# Patient Record
Sex: FEMALE | Race: WHITE | NOT HISPANIC OR LATINO | Employment: FULL TIME | URBAN - METROPOLITAN AREA
[De-identification: names, ages, dates, MRNs, and addresses within clinical notes are randomized per-mention and may not be internally consistent; named-entity substitution may affect disease eponyms.]

---

## 2013-09-05 LAB
EXTERNAL HIV SCREEN: NORMAL
HCV AB SER-ACNC: NEGATIVE

## 2017-03-16 ENCOUNTER — GENERIC CONVERSION - ENCOUNTER (OUTPATIENT)
Dept: OTHER | Facility: OTHER | Age: 37
End: 2017-03-16

## 2017-03-16 ENCOUNTER — ALLSCRIPTS OFFICE VISIT (OUTPATIENT)
Dept: OTHER | Facility: OTHER | Age: 37
End: 2017-03-16

## 2017-03-16 DIAGNOSIS — E11.9 TYPE 2 DIABETES MELLITUS WITHOUT COMPLICATIONS (HCC): ICD-10-CM

## 2017-03-16 DIAGNOSIS — R40.0 SOMNOLENCE: ICD-10-CM

## 2017-03-16 LAB
EST. AVERAGE GLUCOSE BLD GHB EST-MCNC: 143 MG/DL
HBA1C MFR BLD HPLC: 6.6 %

## 2017-03-21 ENCOUNTER — GENERIC CONVERSION - ENCOUNTER (OUTPATIENT)
Dept: OTHER | Facility: OTHER | Age: 37
End: 2017-03-21

## 2017-05-03 ENCOUNTER — ALLSCRIPTS OFFICE VISIT (OUTPATIENT)
Dept: OTHER | Facility: OTHER | Age: 37
End: 2017-05-03

## 2017-07-30 ENCOUNTER — ANESTHESIA EVENT (OUTPATIENT)
Dept: PERIOP | Facility: HOSPITAL | Age: 37
End: 2017-07-30
Payer: COMMERCIAL

## 2017-07-30 RX ORDER — SODIUM CHLORIDE 9 MG/ML
125 INJECTION, SOLUTION INTRAVENOUS CONTINUOUS
Status: CANCELLED | OUTPATIENT
Start: 2017-08-03

## 2017-07-31 ENCOUNTER — APPOINTMENT (OUTPATIENT)
Dept: PREADMISSION TESTING | Facility: HOSPITAL | Age: 37
End: 2017-07-31
Payer: COMMERCIAL

## 2017-07-31 ENCOUNTER — TRANSCRIBE ORDERS (OUTPATIENT)
Dept: ADMINISTRATIVE | Facility: HOSPITAL | Age: 37
End: 2017-07-31

## 2017-07-31 ENCOUNTER — APPOINTMENT (OUTPATIENT)
Dept: LAB | Facility: HOSPITAL | Age: 37
End: 2017-07-31
Attending: OBSTETRICS & GYNECOLOGY
Payer: COMMERCIAL

## 2017-07-31 VITALS
HEART RATE: 76 BPM | WEIGHT: 209 LBS | HEIGHT: 68 IN | TEMPERATURE: 98.4 F | SYSTOLIC BLOOD PRESSURE: 110 MMHG | DIASTOLIC BLOOD PRESSURE: 70 MMHG | BODY MASS INDEX: 31.67 KG/M2 | RESPIRATION RATE: 18 BRPM

## 2017-07-31 DIAGNOSIS — Z30.2 STERILIZATION: Primary | ICD-10-CM

## 2017-07-31 DIAGNOSIS — Z30.2 STERILIZATION: ICD-10-CM

## 2017-07-31 RX ORDER — VALACYCLOVIR HYDROCHLORIDE 500 MG/1
500 TABLET, FILM COATED ORAL DAILY PRN
COMMUNITY

## 2017-07-31 RX ORDER — ASCORBIC ACID 500 MG
1000 TABLET ORAL DAILY
COMMUNITY

## 2017-07-31 RX ORDER — CITALOPRAM 40 MG/1
40 TABLET ORAL EVERY EVENING
COMMUNITY

## 2017-07-31 RX ORDER — MAG HYDROX/ALUMINUM HYD/SIMETH 400-400-40
1 SUSPENSION, ORAL (FINAL DOSE FORM) ORAL DAILY
COMMUNITY

## 2017-07-31 RX ORDER — AMLODIPINE BESYLATE 10 MG/1
10 TABLET ORAL EVERY EVENING
COMMUNITY
End: 2020-04-14 | Stop reason: SDUPTHER

## 2017-07-31 RX ORDER — CETIRIZINE HYDROCHLORIDE 10 MG/1
10 TABLET ORAL DAILY
COMMUNITY

## 2017-08-03 ENCOUNTER — ANESTHESIA (OUTPATIENT)
Dept: PERIOP | Facility: HOSPITAL | Age: 37
End: 2017-08-03
Payer: COMMERCIAL

## 2017-08-03 ENCOUNTER — HOSPITAL ENCOUNTER (OUTPATIENT)
Facility: HOSPITAL | Age: 37
Setting detail: OUTPATIENT SURGERY
Discharge: HOME | End: 2017-08-03
Attending: OBSTETRICS & GYNECOLOGY | Admitting: OBSTETRICS & GYNECOLOGY
Payer: COMMERCIAL

## 2017-08-03 VITALS
DIASTOLIC BLOOD PRESSURE: 71 MMHG | RESPIRATION RATE: 15 BRPM | SYSTOLIC BLOOD PRESSURE: 134 MMHG | HEIGHT: 68 IN | WEIGHT: 209 LBS | OXYGEN SATURATION: 96 % | HEART RATE: 86 BPM | BODY MASS INDEX: 31.67 KG/M2 | TEMPERATURE: 98.4 F

## 2017-08-03 PROBLEM — Z30.2 REQUEST FOR STERILIZATION: Status: ACTIVE | Noted: 2017-08-03

## 2017-08-03 LAB
EXT PREGNANCY TEST URINE: NEGATIVE
GLUCOSE SERPL-MCNC: 119 MG/DL (ref 65–140)
GLUCOSE SERPL-MCNC: 156 MG/DL (ref 65–140)

## 2017-08-03 PROCEDURE — 81025 URINE PREGNANCY TEST: CPT | Performed by: ANESTHESIOLOGY

## 2017-08-03 PROCEDURE — 82948 REAGENT STRIP/BLOOD GLUCOSE: CPT

## 2017-08-03 DEVICE — DISPOSABLE FALOPE-RING BAND APPLICATOR KIT WITH 8 MM DISPOSABLE TROCAR
Type: IMPLANTABLE DEVICE | Site: FALLOPIAN TUBE | Status: FUNCTIONAL
Brand: FALOPE RING

## 2017-08-03 RX ORDER — PROPOFOL 10 MG/ML
INJECTION, EMULSION INTRAVENOUS AS NEEDED
Status: DISCONTINUED | OUTPATIENT
Start: 2017-08-03 | End: 2017-08-03 | Stop reason: SURG

## 2017-08-03 RX ORDER — ROCURONIUM BROMIDE 10 MG/ML
INJECTION, SOLUTION INTRAVENOUS AS NEEDED
Status: DISCONTINUED | OUTPATIENT
Start: 2017-08-03 | End: 2017-08-03 | Stop reason: SURG

## 2017-08-03 RX ORDER — HYDROCODONE BITARTRATE AND ACETAMINOPHEN 5; 325 MG/1; MG/1
1 TABLET ORAL EVERY 6 HOURS PRN
Status: DISCONTINUED | OUTPATIENT
Start: 2017-08-03 | End: 2017-08-03 | Stop reason: HOSPADM

## 2017-08-03 RX ORDER — ONDANSETRON 2 MG/ML
4 INJECTION INTRAMUSCULAR; INTRAVENOUS EVERY 6 HOURS PRN
Status: DISCONTINUED | OUTPATIENT
Start: 2017-08-03 | End: 2017-08-03 | Stop reason: HOSPADM

## 2017-08-03 RX ORDER — METOCLOPRAMIDE HYDROCHLORIDE 5 MG/ML
INJECTION INTRAMUSCULAR; INTRAVENOUS AS NEEDED
Status: DISCONTINUED | OUTPATIENT
Start: 2017-08-03 | End: 2017-08-03 | Stop reason: SURG

## 2017-08-03 RX ORDER — ONDANSETRON 2 MG/ML
INJECTION INTRAMUSCULAR; INTRAVENOUS AS NEEDED
Status: DISCONTINUED | OUTPATIENT
Start: 2017-08-03 | End: 2017-08-03 | Stop reason: SURG

## 2017-08-03 RX ORDER — SODIUM CHLORIDE 9 MG/ML
125 INJECTION, SOLUTION INTRAVENOUS CONTINUOUS
Status: DISCONTINUED | OUTPATIENT
Start: 2017-08-03 | End: 2017-08-03

## 2017-08-03 RX ORDER — HYDROCODONE BITARTRATE AND ACETAMINOPHEN 5; 325 MG/1; MG/1
1 TABLET ORAL EVERY 6 HOURS PRN
Refills: 0
Start: 2017-08-03 | End: 2017-08-03

## 2017-08-03 RX ORDER — EPHEDRINE SULFATE 50 MG/ML
INJECTION, SOLUTION INTRAVENOUS AS NEEDED
Status: DISCONTINUED | OUTPATIENT
Start: 2017-08-03 | End: 2017-08-03 | Stop reason: SURG

## 2017-08-03 RX ORDER — IBUPROFEN 600 MG/1
600 TABLET ORAL EVERY 6 HOURS PRN
Qty: 30 TABLET | Refills: 0
Start: 2017-08-03 | End: 2018-02-23 | Stop reason: ALTCHOICE

## 2017-08-03 RX ORDER — FENTANYL CITRATE 50 UG/ML
INJECTION, SOLUTION INTRAMUSCULAR; INTRAVENOUS AS NEEDED
Status: DISCONTINUED | OUTPATIENT
Start: 2017-08-03 | End: 2017-08-03 | Stop reason: SURG

## 2017-08-03 RX ORDER — GLYCOPYRROLATE 0.2 MG/ML
INJECTION INTRAMUSCULAR; INTRAVENOUS AS NEEDED
Status: DISCONTINUED | OUTPATIENT
Start: 2017-08-03 | End: 2017-08-03 | Stop reason: SURG

## 2017-08-03 RX ORDER — MEPERIDINE HYDROCHLORIDE 50 MG/ML
12.5 INJECTION INTRAMUSCULAR; INTRAVENOUS; SUBCUTANEOUS AS NEEDED
Status: DISCONTINUED | OUTPATIENT
Start: 2017-08-03 | End: 2017-08-03 | Stop reason: HOSPADM

## 2017-08-03 RX ORDER — SODIUM CHLORIDE 9 MG/ML
125 INJECTION, SOLUTION INTRAVENOUS CONTINUOUS
Status: DISCONTINUED | OUTPATIENT
Start: 2017-08-03 | End: 2017-08-03 | Stop reason: HOSPADM

## 2017-08-03 RX ORDER — LIDOCAINE HYDROCHLORIDE 10 MG/ML
INJECTION, SOLUTION INFILTRATION; PERINEURAL AS NEEDED
Status: DISCONTINUED | OUTPATIENT
Start: 2017-08-03 | End: 2017-08-03 | Stop reason: SURG

## 2017-08-03 RX ORDER — KETOROLAC TROMETHAMINE 30 MG/ML
INJECTION, SOLUTION INTRAMUSCULAR; INTRAVENOUS AS NEEDED
Status: DISCONTINUED | OUTPATIENT
Start: 2017-08-03 | End: 2017-08-03 | Stop reason: SURG

## 2017-08-03 RX ORDER — BUPIVACAINE HYDROCHLORIDE 5 MG/ML
INJECTION, SOLUTION PERINEURAL AS NEEDED
Status: DISCONTINUED | OUTPATIENT
Start: 2017-08-03 | End: 2017-08-03 | Stop reason: HOSPADM

## 2017-08-03 RX ORDER — FENTANYL CITRATE/PF 50 MCG/ML
50 SYRINGE (ML) INJECTION
Status: DISCONTINUED | OUTPATIENT
Start: 2017-08-03 | End: 2017-08-03 | Stop reason: HOSPADM

## 2017-08-03 RX ORDER — HYDROCODONE BITARTRATE AND ACETAMINOPHEN 5; 325 MG/1; MG/1
1 TABLET ORAL EVERY 6 HOURS PRN
Qty: 20 TABLET | Refills: 0 | Status: SHIPPED | OUTPATIENT
Start: 2017-08-03 | End: 2017-08-13

## 2017-08-03 RX ORDER — MIDAZOLAM HYDROCHLORIDE 1 MG/ML
INJECTION INTRAMUSCULAR; INTRAVENOUS AS NEEDED
Status: DISCONTINUED | OUTPATIENT
Start: 2017-08-03 | End: 2017-08-03 | Stop reason: SURG

## 2017-08-03 RX ADMIN — METOCLOPRAMIDE HYDROCHLORIDE 10 MG: 5 INJECTION INTRAMUSCULAR; INTRAVENOUS at 10:07

## 2017-08-03 RX ADMIN — FENTANYL CITRATE 50 MCG: 50 INJECTION INTRAMUSCULAR; INTRAVENOUS at 11:42

## 2017-08-03 RX ADMIN — FENTANYL CITRATE 50 MCG: 50 INJECTION INTRAMUSCULAR; INTRAVENOUS at 10:45

## 2017-08-03 RX ADMIN — NEOSTIGMINE METHYLSULFATE 3 MG: 1 INJECTION, SOLUTION INTRAMUSCULAR; INTRAVENOUS; SUBCUTANEOUS at 10:59

## 2017-08-03 RX ADMIN — FENTANYL CITRATE 50 MCG: 50 INJECTION INTRAMUSCULAR; INTRAVENOUS at 12:01

## 2017-08-03 RX ADMIN — EPHEDRINE SULFATE 5 MG: 50 INJECTION, SOLUTION INTRAMUSCULAR; INTRAVENOUS; SUBCUTANEOUS at 10:15

## 2017-08-03 RX ADMIN — FENTANYL CITRATE 50 MCG: 50 INJECTION INTRAMUSCULAR; INTRAVENOUS at 11:04

## 2017-08-03 RX ADMIN — MIDAZOLAM HYDROCHLORIDE 2 MG: 1 INJECTION, SOLUTION INTRAMUSCULAR; INTRAVENOUS at 09:51

## 2017-08-03 RX ADMIN — FENTANYL CITRATE 50 MCG: 50 INJECTION INTRAMUSCULAR; INTRAVENOUS at 10:37

## 2017-08-03 RX ADMIN — ONDANSETRON HYDROCHLORIDE 4 MG: 2 INJECTION, SOLUTION INTRAVENOUS at 10:25

## 2017-08-03 RX ADMIN — PROPOFOL 200 MG: 10 INJECTION, EMULSION INTRAVENOUS at 09:55

## 2017-08-03 RX ADMIN — FENTANYL CITRATE 100 MCG: 50 INJECTION INTRAMUSCULAR; INTRAVENOUS at 09:55

## 2017-08-03 RX ADMIN — EPHEDRINE SULFATE 5 MG: 50 INJECTION, SOLUTION INTRAMUSCULAR; INTRAVENOUS; SUBCUTANEOUS at 10:02

## 2017-08-03 RX ADMIN — FENTANYL CITRATE 100 MCG: 50 INJECTION INTRAMUSCULAR; INTRAVENOUS at 11:10

## 2017-08-03 RX ADMIN — SODIUM CHLORIDE: 0.9 INJECTION, SOLUTION INTRAVENOUS at 09:51

## 2017-08-03 RX ADMIN — EPHEDRINE SULFATE 5 MG: 50 INJECTION, SOLUTION INTRAMUSCULAR; INTRAVENOUS; SUBCUTANEOUS at 10:27

## 2017-08-03 RX ADMIN — DEXAMETHASONE SODIUM PHOSPHATE 10 MG: 10 INJECTION INTRAMUSCULAR; INTRAVENOUS at 10:10

## 2017-08-03 RX ADMIN — FENTANYL CITRATE 50 MCG: 50 INJECTION INTRAMUSCULAR; INTRAVENOUS at 11:31

## 2017-08-03 RX ADMIN — SODIUM CHLORIDE 125 ML/HR: 0.9 INJECTION, SOLUTION INTRAVENOUS at 09:11

## 2017-08-03 RX ADMIN — KETOROLAC TROMETHAMINE 30 MG: 30 INJECTION, SOLUTION INTRAMUSCULAR at 10:59

## 2017-08-03 RX ADMIN — LIDOCAINE HYDROCHLORIDE 50 MG: 10 INJECTION, SOLUTION INFILTRATION; PERINEURAL at 09:55

## 2017-08-03 RX ADMIN — FENTANYL CITRATE 100 MCG: 50 INJECTION INTRAMUSCULAR; INTRAVENOUS at 10:20

## 2017-08-03 RX ADMIN — ROCURONIUM BROMIDE 20 MG: 10 INJECTION, SOLUTION INTRAVENOUS at 09:57

## 2017-08-03 RX ADMIN — FENTANYL CITRATE 50 MCG: 50 INJECTION INTRAMUSCULAR; INTRAVENOUS at 10:55

## 2017-08-03 RX ADMIN — ROCURONIUM BROMIDE 10 MG: 10 INJECTION, SOLUTION INTRAVENOUS at 10:12

## 2017-08-03 RX ADMIN — GLYCOPYRROLATE 0.5 MG: 0.2 INJECTION, SOLUTION INTRAMUSCULAR; INTRAVENOUS at 10:59

## 2017-08-03 RX ADMIN — PROPOFOL 50 MG: 10 INJECTION, EMULSION INTRAVENOUS at 09:57

## 2017-10-30 ENCOUNTER — ALLSCRIPTS OFFICE VISIT (OUTPATIENT)
Dept: OTHER | Facility: OTHER | Age: 37
End: 2017-10-30

## 2017-11-01 NOTE — PROGRESS NOTES
Assessment    1  Acute frontal sinusitis (461 1) (J01 10)    Plan  Acute frontal sinusitis    · Promethazine-Codeine 6 25-10 MG/5ML Oral Syrup; TAKE 5 ML BY MOUTH ATBEDTIME AS NEEDED   · Amoxicillin-Pot Clavulanate 875-125 MG Oral Tablet; Take one tablet twice dailyfor 7 days   · Benzonatate 100 MG Oral Capsule; TAKE 1 CAPSULE 3 TIMES DAILY ASNEEDED    Discussion/Summary    Take full course of abx  rest  stay well hydrated  Take promethazine with codiene at night and tessalon during the day  return for new/worsening s/sx  The patient was counseled regarding instructions for management,-- risk factor reductions,-- prognosis,-- impressions  Chief Complaint  cough      History of Present Illness  Cold Symptoms:   Alanna Rosa presents with complaints of gradual onset of constant episodes of mild cold symptoms Episodes started 2 weeks ago (pt has tried mucinex without relief  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)  Associated symptoms include nasal congestion,-- runny nose,-- scratchy throat,-- sore throat,-- productive cough,-- facial pressure,-- facial pain,-- headache,-- nausea-- and-- fever, but-- no wheezing,-- no shortness of breath,-- no vomiting-- and-- no chills  Review of Systems   Constitutional: feeling poorly,-- chills-- and-- feeling tired, but-- no fever  ENT: as noted in HPI  Cardiovascular: the heart rate was not slow,-- no chest pain,-- the heart rate was not fast-- and-- no palpitations  Respiratory: cough, but-- no shortness of breath-- and-- no wheezing  Gastrointestinal: nausea, but-- no abdominal pain-- and-- no vomiting  Musculoskeletal: no arthralgias-- and-- no myalgias  Integumentary: no rashes  Active Problems  1  Benign essential HTN (401 1) (I10)   2  Daytime somnolence (780 54) (R40 0)   3  Depression (311) (F32 9)   4  Diabetes mellitus type 2, controlled, without complications (507 65) (K98 2)   5  Heart murmur (785 2) (R01 1)   6   Heart palpitations (785  1) (R00 2)   7  Hyperlipidemia (272 4) (E78 5)   8  Low vitamin D level (268 9) (E55 9)    Past Medical History  Active Problems And Past Medical History Reviewed: The active problems and past medical history were reviewed and updated today  Surgical History  Surgical History Reviewed: The surgical history was reviewed and updated today  Social History     · Alcohol use (V49 89) (Z78 9)   · 2 times a month   · Denied: History of Drug use   · Former smoker (V15 82) (V35 106)  The social history was reviewed and updated today  The social history was reviewed and is unchanged  Family History  Family History Reviewed: The family history was reviewed and updated today  Current Meds   1  AmLODIPine Besylate 10 MG Oral Tablet; Take 1 tablet daily; Last Rx:06Jun2016 Ordered   2  Citalopram Hydrobromide 20 MG Oral Tablet; Take 1 tablet daily; Last Rx:06Jun2016 Ordered   3  Embrace Ran Blood Glucose Test In Vitro Strip; TEST TWICE DAILY; Therapy: 13Apr2017 to (Evaluate:08Jan2018)  Requested for: 18Apr2017; Last Rx:13Apr2017; Status: ACTIVE - Transmit to Pharmacy - Awaiting Verification Ordered   4  MetFORMIN HCl - 500 MG Oral Tablet; TAKE 1 TABLET TWICE DAILY,  WITH MORNING AND EVENING MEAL  Requested for: [id number]; Last Rx:16Mar2017 Ordered   5  Seasonique 0 15-0 03 &0 01 MG Oral Tablet; TAKE 1 TABLET DAILY; Therapy: [id number] to Recorded   6  ValACYclovir HCl - 500 MG Oral Tablet; Take 1 tablet daily; Therapy: (Recorded:58Emg0000) to Recorded    The medication list was reviewed and updated today  Allergies  1  Percocet TABS  2  Other   3  Seasonal    Vitals   Recorded: [id number] 03:55PM   Temperature 98 6 F   Heart Rate 73   Respiration 18   Systolic 564   Diastolic 82   Height 5 ft 8 in   Weight 211 lb 9 6 oz   BMI Calculated 32 17   BSA Calculated 2 09   O2 Saturation 98       Physical Exam   Constitutional  General appearance: No acute distress, well appearing and well nourished  Eyes  Conjunctiva and lids: No swelling, erythema or discharge  -- wears glasses  Pupils and irises: Equal, round and reactive to light  Ears, Nose, Mouth, and Throat  External inspection of ears and nose: Normal    Otoscopic examination: Tympanic membranes translucent with normal light reflex  Canals patent without erythema  Nasal mucosa, septum, and turbinates: Abnormal   There was a mucoid discharge from both nares  The bilateral nasal mucosa was boggy,-- crusted-- and-- edematous  -- + TTP to B/L frontal sinus  Oropharynx: Normal with no erythema, edema, exudate or lesions  -- MMM  Pulmonary  Respiratory effort: No increased work of breathing or signs of respiratory distress  Auscultation of lungs: Clear to auscultation  Cardiovascular  Auscultation of heart: Normal rate and rhythm, normal S1 and S2, without murmurs  Lymphatic  Palpation of lymph nodes in neck: No lymphadenopathy  Musculoskeletal  Gait and station: Normal    Skin  Skin and subcutaneous tissue: Normal without rashes or lesions  Psychiatric  Orientation to person, place, and time: Normal    Mood and affect: Normal          Signatures   Electronically signed by : Engana Pty, [facility]; Oct 30 2017  4:14PM EST                       (Author)    Electronically signed by :  Kathleen Muñoz MD; Oct 31 2017 10:19AM EST                       (Author)

## 2017-12-13 ENCOUNTER — GENERIC CONVERSION - ENCOUNTER (OUTPATIENT)
Dept: OTHER | Facility: OTHER | Age: 37
End: 2017-12-13

## 2018-01-12 VITALS
BODY MASS INDEX: 32.97 KG/M2 | OXYGEN SATURATION: 98 % | HEIGHT: 66 IN | WEIGHT: 205.13 LBS | SYSTOLIC BLOOD PRESSURE: 130 MMHG | TEMPERATURE: 98.3 F | DIASTOLIC BLOOD PRESSURE: 78 MMHG | HEART RATE: 89 BPM

## 2018-01-12 NOTE — MISCELLANEOUS
Message  Message Free Text Note Form: I left a voice mail for [facility] regarding the referral she received for the [facility]       Active Problems    1  Benign essential HTN (401 1) (I10)   2  Daytime somnolence (780 54) (R40 0)   3  Depression (311) (F32 9)   4  Diabetes mellitus type 2, controlled, without complications (238 29) (N66 8)   5  Heart murmur (785 2) (R01 1)   6  Heart palpitations (785 1) (R00 2)   7  Hyperlipidemia (272 4) (E78 5)   8  Low vitamin D level (268 9) (E55 9)    Current Meds   1  AmLODIPine Besylate 10 MG Oral Tablet; Take 1 tablet daily; Last Rx:06Jun2016   Ordered   2  Citalopram Hydrobromide 20 MG Oral Tablet; Take 1 tablet daily; Last Rx:06Jun2016   Ordered   3  MetFORMIN HCl - 500 MG Oral Tablet; TAKE 1 TABLET TWICE DAILY,  WITH MORNING   AND EVENING MEAL  Requested for: [id number]; Last Rx:16Mar2017 Ordered   4  Seasonique 0 15-0 03 &0 01 MG Oral Tablet (Levonorgest-Eth Estrad 91-Day); TAKE 1   TABLET DAILY; Therapy: [id number] to Recorded   5  ValACYclovir HCl - 500 MG Oral Tablet; Take 1 tablet daily; Therapy: (Recorded:19Byq9465) to Recorded    Allergies    1  Percocet TABS    2  Other   3   Seasonal    Signatures   Electronically signed by : Austin Betancur, ; Mar 21 2017 10:46AM EST                       (Author)

## 2018-01-14 VITALS
DIASTOLIC BLOOD PRESSURE: 64 MMHG | SYSTOLIC BLOOD PRESSURE: 118 MMHG | BODY MASS INDEX: 32.52 KG/M2 | WEIGHT: 201.5 LBS | HEART RATE: 80 BPM

## 2018-01-14 VITALS
OXYGEN SATURATION: 98 % | RESPIRATION RATE: 18 BRPM | HEART RATE: 73 BPM | DIASTOLIC BLOOD PRESSURE: 82 MMHG | BODY MASS INDEX: 32.07 KG/M2 | TEMPERATURE: 98.6 F | WEIGHT: 211.6 LBS | SYSTOLIC BLOOD PRESSURE: 122 MMHG | HEIGHT: 68 IN

## 2018-01-19 ENCOUNTER — GENERIC CONVERSION - ENCOUNTER (OUTPATIENT)
Dept: OTHER | Facility: OTHER | Age: 38
End: 2018-01-19

## 2018-01-24 VITALS
HEART RATE: 85 BPM | SYSTOLIC BLOOD PRESSURE: 128 MMHG | HEIGHT: 67 IN | BODY MASS INDEX: 33.33 KG/M2 | TEMPERATURE: 99 F | OXYGEN SATURATION: 98 % | WEIGHT: 212.38 LBS | DIASTOLIC BLOOD PRESSURE: 80 MMHG

## 2018-01-24 VITALS
WEIGHT: 217 LBS | DIASTOLIC BLOOD PRESSURE: 82 MMHG | TEMPERATURE: 99 F | HEIGHT: 67 IN | OXYGEN SATURATION: 98 % | BODY MASS INDEX: 34.06 KG/M2 | HEART RATE: 96 BPM | SYSTOLIC BLOOD PRESSURE: 130 MMHG

## 2018-02-23 ENCOUNTER — OFFICE VISIT (OUTPATIENT)
Dept: INTERNAL MEDICINE CLINIC | Facility: CLINIC | Age: 38
End: 2018-02-23
Payer: COMMERCIAL

## 2018-02-23 VITALS
TEMPERATURE: 99.1 F | BODY MASS INDEX: 34.52 KG/M2 | SYSTOLIC BLOOD PRESSURE: 140 MMHG | WEIGHT: 214.8 LBS | HEIGHT: 66 IN | DIASTOLIC BLOOD PRESSURE: 80 MMHG | HEART RATE: 90 BPM | OXYGEN SATURATION: 98 %

## 2018-02-23 DIAGNOSIS — I10 BENIGN ESSENTIAL HTN: ICD-10-CM

## 2018-02-23 DIAGNOSIS — J20.9 ACUTE BRONCHITIS, UNSPECIFIED ORGANISM: Primary | ICD-10-CM

## 2018-02-23 DIAGNOSIS — E11.9 CONTROLLED TYPE 2 DIABETES MELLITUS WITHOUT COMPLICATION, WITHOUT LONG-TERM CURRENT USE OF INSULIN (HCC): ICD-10-CM

## 2018-02-23 PROBLEM — R40.0 DAYTIME SOMNOLENCE: Status: ACTIVE | Noted: 2017-03-16

## 2018-02-23 PROCEDURE — 99213 OFFICE O/P EST LOW 20 MIN: CPT | Performed by: INTERNAL MEDICINE

## 2018-02-23 RX ORDER — BENZONATATE 100 MG/1
100 CAPSULE ORAL 3 TIMES DAILY PRN
Qty: 30 CAPSULE | Refills: 1 | Status: SHIPPED | OUTPATIENT
Start: 2018-02-23 | End: 2018-03-05

## 2018-02-23 RX ORDER — CHLORPHENIRAMINE MALEATE 4 MG/1
4 TABLET ORAL EVERY 6 HOURS PRN
Qty: 30 TABLET | Refills: 0 | Status: SHIPPED | OUTPATIENT
Start: 2018-02-23 | End: 2018-07-30

## 2018-02-23 NOTE — ASSESSMENT & PLAN NOTE
No antibiotics were necessary    We will prescribe some cough suppressant and recommend some decongestant therapy along with over-the-counter Mucinex tablets every 12 hours

## 2018-02-23 NOTE — PROGRESS NOTES
Assessment/Plan:      Diagnoses and all orders for this visit:    Acute bronchitis, unspecified organism  -     benzonatate (TESSALON PERLES) 100 mg capsule; Take 1 capsule (100 mg total) by mouth 3 (three) times a day as needed for cough for up to 10 days  -     chlorpheniramine (CHLOR-TRIMETON) 4 MG tablet; Take 1 tablet (4 mg total) by mouth every 6 (six) hours as needed for allergies    Benign essential HTN  -     benzonatate (TESSALON PERLES) 100 mg capsule; Take 1 capsule (100 mg total) by mouth 3 (three) times a day as needed for cough for up to 10 days    Controlled type 2 diabetes mellitus without complication, without long-term current use of insulin ([facility])          Subjective:     Patient ID: Baron Mckeon is a 42 y o  female  Patient presents to the office with cough and cold symptoms for the past week which are worse in the past 24 hours with development of some chest discomfort secondary to coughing associated with sore throat  She denies any fevers, chills, dyspnea, diaphoresis, night sweats  No diarrhea, nausea or vomiting  She has recently been treated for acute sinusitis with antibiotics  Review of Systems   Constitutional: Positive for fatigue  Negative for activity change, appetite change, chills, diaphoresis, fever and unexpected weight change  HENT: Positive for congestion, postnasal drip, rhinorrhea and sneezing  Negative for ear pain, facial swelling and trouble swallowing  Eyes: Negative  Respiratory: Positive for cough  Negative for shortness of breath and wheezing  Cardiovascular: Negative  Gastrointestinal: Negative  Endocrine: Negative  Genitourinary: Negative  Musculoskeletal: Negative  Skin: Negative  Objective:     Physical Exam   Constitutional: She is oriented to person, place, and time  She appears well-developed and well-nourished  Moderately obese   HENT:   Head: Normocephalic and atraumatic     Right Ear: External ear normal  Nose: Nose normal    Mouth/Throat: Oropharynx is clear and moist  No oropharyngeal exudate  Eyes: Conjunctivae and EOM are normal  Pupils are equal, round, and reactive to light  Right eye exhibits no discharge  Left eye exhibits no discharge  No scleral icterus  Neck: Normal range of motion  Neck supple  No JVD present  No tracheal deviation present  No thyromegaly present  Cardiovascular: Normal rate, regular rhythm, normal heart sounds and intact distal pulses  Pulmonary/Chest: No respiratory distress  She has no wheezes  She has no rales  She exhibits no tenderness  Abdominal: She exhibits no distension  Musculoskeletal: Normal range of motion  She exhibits no edema  Lymphadenopathy:     She has cervical adenopathy  Neurological: She is alert and oriented to person, place, and time  No cranial nerve deficit  Coordination normal    Skin: Skin is warm and dry  Psychiatric: She has a normal mood and affect  Vitals reviewed

## 2018-02-23 NOTE — PATIENT INSTRUCTIONS
Symptomatic treatment is recommended for cough suppression  Maintain hydration with adequate fluids    Rest   Tylenol as needed for fever

## 2018-02-27 ENCOUNTER — OFFICE VISIT (OUTPATIENT)
Dept: INTERNAL MEDICINE CLINIC | Facility: CLINIC | Age: 38
End: 2018-02-27
Payer: COMMERCIAL

## 2018-02-27 VITALS
HEIGHT: 68 IN | TEMPERATURE: 98 F | WEIGHT: 213.4 LBS | OXYGEN SATURATION: 98 % | HEART RATE: 93 BPM | DIASTOLIC BLOOD PRESSURE: 88 MMHG | RESPIRATION RATE: 20 BRPM | SYSTOLIC BLOOD PRESSURE: 144 MMHG | BODY MASS INDEX: 32.34 KG/M2

## 2018-02-27 DIAGNOSIS — J02.0 STREP THROAT: Primary | ICD-10-CM

## 2018-02-27 DIAGNOSIS — J20.9 ACUTE BRONCHITIS, UNSPECIFIED ORGANISM: ICD-10-CM

## 2018-02-27 DIAGNOSIS — R68.89 FLU-LIKE SYMPTOMS: Primary | ICD-10-CM

## 2018-02-27 DIAGNOSIS — R68.89 FLU-LIKE SYMPTOMS: ICD-10-CM

## 2018-02-27 LAB — S PYO AG THROAT QL: NEGATIVE

## 2018-02-27 PROCEDURE — 87798 DETECT AGENT NOS DNA AMP: CPT | Performed by: NURSE PRACTITIONER

## 2018-02-27 PROCEDURE — 87070 CULTURE OTHR SPECIMN AEROBIC: CPT

## 2018-02-27 PROCEDURE — 99213 OFFICE O/P EST LOW 20 MIN: CPT | Performed by: NURSE PRACTITIONER

## 2018-02-27 PROCEDURE — 87430 STREP A AG IA: CPT | Performed by: NURSE PRACTITIONER

## 2018-02-27 PROCEDURE — 87147 CULTURE TYPE IMMUNOLOGIC: CPT

## 2018-02-27 RX ORDER — OSELTAMIVIR PHOSPHATE 75 MG/1
75 CAPSULE ORAL EVERY 12 HOURS SCHEDULED
Qty: 10 CAPSULE | Refills: 0 | Status: SHIPPED | OUTPATIENT
Start: 2018-02-27 | End: 2018-03-04

## 2018-02-27 NOTE — PROGRESS NOTES
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 year  She was originally seen on 1/19/18 and diagnoseed with acute frontal sinusitis and was put on Cefdinir  Then she was seen on 2/23/18 and was diagnosed with bronchitis and was given Tessalon Pearls and Chlorpheniramine  URI    This is a new problem  Episode onset: 5 days  The problem has been gradually worsening  Maximum temperature: has not taking fevers  Associated symptoms include chest pain (chest discomfort from coughing ), congestion, coughing (productive light brown cough), ear pain, headaches, joint pain, a plugged ear sensation, rhinorrhea, sinus pain and a sore throat  Pertinent negatives include no abdominal pain, diarrhea, dysuria, joint swelling, nausea, neck pain, rash, sneezing, swollen glands, vomiting or wheezing  Treatments tried: has not tried tessalon pearls or Chlorpheniramine, has been taking Zertec, Musinex and Flonase  The treatment provided no relief  The following portions of the patient's history were reviewed and updated as appropriate: allergies, current medications, past family history, past medical history, past social history, past surgical history and problem list     Review of Systems   Constitutional: Positive for appetite change (decreased ), chills, diaphoresis (hot flashes ) and fatigue  Negative for fever     HENT: Positive for congestion, ear pain, postnasal drip, rhinorrhea, sinus pain, sinus pressure and sore throat  Negative for dental problem and sneezing  Eyes: Negative for pain, discharge and itching  Respiratory: Positive for cough (productive light brown cough), chest tightness and shortness of breath  Negative for wheezing  Cardiovascular: Positive for chest pain (chest discomfort from coughing )  Gastrointestinal: Negative for abdominal pain, diarrhea, nausea and vomiting  Genitourinary: Negative for decreased urine volume, difficulty urinating, dysuria and urgency  Musculoskeletal: Positive for joint pain  Negative for neck pain  Skin: Negative for rash  Neurological: Positive for dizziness and headaches  Negative for light-headedness and numbness           Past Medical History:   Diagnosis Date    Anxiety     Depression 5/4/2016    Diabetes mellitus ([facility] )     NIDDM    Hypertension     Request for sterilization     Scratch     left lower arm- healing- scabbed    Seasonal allergies          Current Outpatient Prescriptions:     amLODIPine (NORVASC) 10 mg tablet, Take 10 mg by mouth every evening, Disp: , Rfl:     ascorbic acid (VITAMIN C) 500 mg tablet, Take 1,000 mg by mouth daily  , Disp: , Rfl:     benzonatate (TESSALON PERLES) 100 mg capsule, Take 1 capsule (100 mg total) by mouth 3 (three) times a day as needed for cough for up to 10 days, Disp: 30 capsule, Rfl: 1    cetirizine (ZyrTEC) 10 mg tablet, Take 10 mg by mouth daily, Disp: , Rfl:     Cholecalciferol (VITAMIN D3) 5000 units CAPS, Take 1 capsule by mouth daily, Disp: , Rfl:     Levonorgest-Eth Estrad 91-Day (SEASONIQUE PO), Take 1 tablet by mouth every evening, Disp: , Rfl:     metFORMIN (GLUCOPHAGE) 500 mg tablet, Take 500 mg by mouth daily with breakfast, Disp: , Rfl:     Naproxen Sodium (ALEVE PO), Take 1 tablet by mouth 2 (two) times a day as needed, Disp: , Rfl:     valACYclovir (VALTREX) 500 mg tablet, Take 500 mg by mouth daily as needed, Disp: , Rfl:     chlorpheniramine (CHLOR-TRIMETON) 4 MG tablet, Take 1 tablet (4 mg total) by mouth every 6 (six) hours as needed for allergies, Disp: 30 tablet, Rfl: 0    citalopram (CeleXA) 40 mg tablet, Take 40 mg by mouth every evening, Disp: , Rfl:     oseltamivir (TAMIFLU) 75 mg capsule, Take 1 capsule (75 mg total) by mouth every 12 (twelve) hours for 5 days, Disp: 10 capsule, Rfl: 0    Allergies   Allergen Reactions    Percocet [Oxycodone-Acetaminophen] Itching     Category: Allergy;     Other      Annotation - [id number]: raw fruit and vegetables    Pollen Extract     Tramadol Itching       Social History   Past Surgical History:   Procedure Laterality Date     SECTION      OVARIAN CYST SURGERY      MO LAP,TUBAL CAUTERY N/A 8/3/2017    Procedure: LIGATION/COAGULATION TUBAL LAPAROSCOPIC; LYSIS OF ADHESIONS;  Surgeon: Beverly Noble DO;  [facility];  Service: Gynecology    WISDOM TOOTH EXTRACTION       Family History   Problem Relation Age of Onset    No Known Problems Mother     Diabetes type II Father        Objective:  /88 (BP Location: Left arm, Patient Position: Sitting, Cuff Size: Adult)   Pulse 93   Temp 98 °F (36 7 °C) (Oral)   Resp 20   Ht 5' 8" (1 727 m)   Wt 96 8 kg (213 lb 6 4 oz)   SpO2 98%   BMI 32 45 kg/m²     No results found for this or any previous visit (from the past 1344 hour(s))  Physical Exam   Constitutional: She is oriented to person, place, and time  She appears well-developed and well-nourished  She appears ill  HENT:   Head: Normocephalic and atraumatic  Right Ear: Hearing normal  Tympanic membrane is erythematous and bulging  Left Ear: Hearing normal  Tympanic membrane is erythematous and bulging  Nose: Rhinorrhea present  Right sinus exhibits maxillary sinus tenderness and frontal sinus tenderness  Left sinus exhibits maxillary sinus tenderness and frontal sinus tenderness     Mouth/Throat: Mucous membranes are pale and dry  Posterior oropharyngeal erythema present  No oropharyngeal exudate  Eyes: Conjunctivae are normal  Pupils are equal, round, and reactive to light  Right eye exhibits no discharge  Left eye exhibits no discharge  Neck: Normal range of motion  Neck supple  No thyromegaly present  Cardiovascular: Normal rate, regular rhythm, normal heart sounds and intact distal pulses  Exam reveals no gallop and no friction rub  No murmur heard  Pulmonary/Chest: Effort normal and breath sounds normal  No stridor  No respiratory distress  She has no wheezes  She has no rales  She exhibits no tenderness  Abdominal: Soft  Bowel sounds are normal  She exhibits no distension  There is no tenderness  Lymphadenopathy:     She has cervical adenopathy  Neurological: She is alert and oriented to person, place, and time  Skin: Skin is warm and dry  Psychiatric: She has a normal mood and affect   Her behavior is normal  Judgment normal

## 2018-02-27 NOTE — PATIENT INSTRUCTIONS
Continue with Chlorpheniramine, Flonase, and/or Zyrtec  Tessalon pearls for cough  Stay hydrated and rest   Will sent swab for the flu and strep  Influenza   AMBULATORY CARE:   Influenza  (the flu) is an infection caused by the influenza virus  The flu is easily spread when an infected person coughs, sneezes, or has close contact with others  You may be able to spread the flu to others for 1 week or longer after signs or symptoms appear  Common signs and symptoms include the following:   · Fever and chills    · Headaches, body aches, and muscle or joint pain    · Cough, runny nose, and sore throat    · Loss of appetite, nausea, vomiting, or diarrhea    · Tiredness    · Trouble breathing  Call 911 for any of the following:   · You have trouble breathing, and your lips look purple or blue  · You have a seizure  Seek care immediately if:   · You are dizzy, or you are urinating less or not at all  · You have a headache with a stiff neck, and you feel tired or confused  · You have new pain or pressure in your chest     · Your symptoms, such as shortness of breath, vomiting, or diarrhea, get worse  · Your symptoms, such as fever and coughing, seem to get better, but then get worse  Contact your healthcare provider if:   · You have new muscle pain or weakness  · You have questions or concerns about your condition or care  Treatment for influenza  may include any of the following:  · Acetaminophen  decreases pain and fever  It is available without a doctor's order  Ask how much to take and how often to take it  Follow directions  Acetaminophen can cause liver damage if not taken correctly  · NSAIDs , such as ibuprofen, help decrease swelling, pain, and fever  This medicine is available with or without a doctor's order  NSAIDs can cause stomach bleeding or kidney problems in certain people  If you take blood thinner medicine, always ask your healthcare provider if NSAIDs are safe for you  Always read the medicine label and follow directions  · Antivirals  help fight a viral infection  Manage your symptoms:   · Rest  as much as you can to help you recover  · Drink liquids as directed  to help prevent dehydration  Ask how much liquid to drink each day and which liquids are best for you  Prevent the spread of the flu:   · Wash your hands often  Use soap and water  Wash your hands after you use the bathroom, change a child's diapers, or sneeze  Wash your hands before you prepare or eat food  Use gel hand cleanser when soap and water are not available  Do not touch your eyes, nose, or mouth unless you have washed your hands first            · Cover your mouth when you sneeze or cough  Cough into a tissue or the bend of your arm  · Clean shared items with a germ-killing   Clean table surfaces, doorknobs, and light switches  Do not share towels, silverware, and dishes with people who are sick  Wash bed sheets, towels, silverware, and dishes with soap and water  · Wear a mask  over your mouth and nose if you are sick or are near anyone who is sick  · Stay away from others  if you are sick  · Influenza vaccine  helps prevent influenza (flu)  Everyone older than 6 months should get a yearly influenza vaccine  Get the vaccine as soon as it is available, usually in September or October each year  Follow up with your healthcare provider as directed:  Write down your questions so you remember to ask them during your visits  © 2017 [address] [employer] Information is for End User's use only and may not be sold, redistributed or otherwise used for commercial purposes  All illustrations and images included in CareNotes® are the copyrighted property of [employer]  or [employer]  The above information is an  only  It is not intended as medical advice for individual conditions or treatments   Talk to your doctor, nurse or pharmacist before following any medical regimen to see if it is safe and effective for you

## 2018-02-27 NOTE — LETTER
February 27, 2018     Patient: Sudhir Daugherty   YOB: 1980   Date of Visit: 2/27/2018       To Whom it May Concern:    Kenroy Stephenson is under my professional care  She was seen in my office on 2/27/2018  She may return to work on 3/1/18  If you have any questions or concerns, please don't hesitate to call           Sincerely,          NAS Negrete        CC: No Recipients

## 2018-02-28 LAB
FLUAV AG SPEC QL: DETECTED
FLUBV AG SPEC QL: ABNORMAL
RSV B RNA SPEC QL NAA+PROBE: ABNORMAL

## 2018-03-02 PROBLEM — J02.0 STREP THROAT: Status: ACTIVE | Noted: 2018-03-02

## 2018-03-02 LAB — BACTERIA THROAT CULT: ABNORMAL

## 2018-03-02 RX ORDER — AMOXICILLIN AND CLAVULANATE POTASSIUM 875; 125 MG/1; MG/1
1 TABLET, FILM COATED ORAL EVERY 12 HOURS SCHEDULED
Qty: 20 TABLET | Refills: 0 | Status: SHIPPED | OUTPATIENT
Start: 2018-03-02 | End: 2018-03-12

## 2018-05-08 ENCOUNTER — OFFICE VISIT (OUTPATIENT)
Dept: INTERNAL MEDICINE CLINIC | Facility: CLINIC | Age: 38
End: 2018-05-08
Payer: COMMERCIAL

## 2018-05-08 VITALS
HEIGHT: 68 IN | DIASTOLIC BLOOD PRESSURE: 80 MMHG | TEMPERATURE: 99.3 F | HEART RATE: 82 BPM | OXYGEN SATURATION: 98 % | RESPIRATION RATE: 20 BRPM | BODY MASS INDEX: 32.61 KG/M2 | WEIGHT: 215.2 LBS | SYSTOLIC BLOOD PRESSURE: 120 MMHG

## 2018-05-08 DIAGNOSIS — J02.9 SORE THROAT: Primary | ICD-10-CM

## 2018-05-08 LAB — S PYO AG THROAT QL: NEGATIVE

## 2018-05-08 PROCEDURE — 87147 CULTURE TYPE IMMUNOLOGIC: CPT | Performed by: NURSE PRACTITIONER

## 2018-05-08 PROCEDURE — 87070 CULTURE OTHR SPECIMN AEROBIC: CPT | Performed by: NURSE PRACTITIONER

## 2018-05-08 PROCEDURE — 99213 OFFICE O/P EST LOW 20 MIN: CPT | Performed by: NURSE PRACTITIONER

## 2018-05-08 PROCEDURE — 87880 STREP A ASSAY W/OPTIC: CPT | Performed by: NURSE PRACTITIONER

## 2018-05-08 RX ORDER — AZITHROMYCIN 250 MG/1
TABLET, FILM COATED ORAL
Qty: 6 TABLET | Refills: 0 | Status: SHIPPED | OUTPATIENT
Start: 2018-05-08 | End: 2018-05-10 | Stop reason: DRUGHIGH

## 2018-05-08 NOTE — PROGRESS NOTES
Assessment/Plan:     Diagnoses and all orders for this visit:    Sore throat  Patient had negative rapid strepA test in the office today  Will send for culture  Start azithromycin as directed  Increase fluids  Get plenty of rest   Call office should symptoms worsen or not improve with current therapy  I will call with culture results  -     POCT rapid strepA  -     azithromycin (ZITHROMAX) 250 mg tablet; Take 2 tablets today then 1 tablet daily until gone  -     Throat culture; Future      Subjective:      Patient ID: Ceci Chance is a 42 y o  female  Sore Throat  Patient complains of sore throat  Associated symptoms include headache, sore throat, and cough  Onset of symptoms was 1 day ago, and have been unchanged since that time  She is drinking plenty of fluids  She has not had recent close exposure to someone with proven streptococcal pharyngitis  Patient does have "allergy issues"  She takes Zyrtec year around and started [facility] spring season  Patient has medical history of hypertension, type 2 diabetes mellitus  The following portions of the patient's history were reviewed and updated as appropriate: allergies, current medications, past family history, past medical history, past social history, past surgical history and problem list     Review of Systems   Constitutional: Positive for fatigue  Negative for chills and fever  HENT: Positive for congestion, postnasal drip and sore throat  Negative for ear discharge, ear pain, sinus pain and sinus pressure  Respiratory: Positive for cough  Negative for chest tightness, shortness of breath and wheezing  Cardiovascular: Negative for chest pain, palpitations and leg swelling  Gastrointestinal: Negative  Musculoskeletal: Negative  Skin: Negative  Hematological: Negative for adenopathy  Does not bruise/bleed easily         Objective:    /80 (BP Location: Left arm, Patient Position: Sitting, Cuff Size: Adult)   Pulse 82 Temp 99 3 °F (37 4 °C) (Oral)   Resp 20   Ht 5' 7 75" (1 721 m)   Wt 97 6 kg (215 lb 3 2 oz)   SpO2 98%   BMI 32 96 kg/m²      Physical Exam   Constitutional: She is oriented to person, place, and time  She appears well-developed and well-nourished  No distress  HENT:   Head: Normocephalic and atraumatic  Right Ear: External ear normal    Left Ear: External ear normal    Mouth/Throat: Oropharyngeal exudate present  Throat erythematous  Eyes: Conjunctivae and EOM are normal  Pupils are equal, round, and reactive to light  Neck: Normal range of motion  Neck supple  No thyromegaly present  Cardiovascular: Normal rate, regular rhythm and normal heart sounds  Pulmonary/Chest: Effort normal and breath sounds normal    Musculoskeletal: She exhibits no edema or tenderness  Lymphadenopathy:     She has no cervical adenopathy  Neurological: She is alert and oriented to person, place, and time  Skin: Skin is warm and dry

## 2018-05-08 NOTE — PATIENT INSTRUCTIONS
Take antibiotic until gone  Call office if your symptoms worsen or do not improve  Drink plenty of fluids and get rest    I will call you with your culture results

## 2018-05-10 DIAGNOSIS — A49.1 STREPTOCOCCAL INFECTION GROUP C: Primary | ICD-10-CM

## 2018-05-10 LAB — BACTERIA THROAT CULT: ABNORMAL

## 2018-05-10 RX ORDER — AMOXICILLIN 500 MG/1
500 TABLET, FILM COATED ORAL 2 TIMES DAILY
Qty: 20 TABLET | Refills: 0 | Status: SHIPPED | OUTPATIENT
Start: 2018-05-10 | End: 2018-05-20

## 2018-07-30 ENCOUNTER — OFFICE VISIT (OUTPATIENT)
Dept: INTERNAL MEDICINE CLINIC | Facility: CLINIC | Age: 38
End: 2018-07-30
Payer: COMMERCIAL

## 2018-07-30 VITALS
HEART RATE: 86 BPM | HEIGHT: 66 IN | DIASTOLIC BLOOD PRESSURE: 80 MMHG | WEIGHT: 218.6 LBS | OXYGEN SATURATION: 98 % | BODY MASS INDEX: 35.13 KG/M2 | SYSTOLIC BLOOD PRESSURE: 132 MMHG | TEMPERATURE: 99 F

## 2018-07-30 DIAGNOSIS — J01.11 ACUTE RECURRENT FRONTAL SINUSITIS: Primary | ICD-10-CM

## 2018-07-30 PROCEDURE — 99213 OFFICE O/P EST LOW 20 MIN: CPT | Performed by: NURSE PRACTITIONER

## 2018-07-30 RX ORDER — AMOXICILLIN AND CLAVULANATE POTASSIUM 875; 125 MG/1; MG/1
1 TABLET, FILM COATED ORAL EVERY 12 HOURS SCHEDULED
Qty: 20 TABLET | Refills: 0 | Status: SHIPPED | OUTPATIENT
Start: 2018-07-30 | End: 2018-08-09

## 2018-07-30 NOTE — PROGRESS NOTES
Assessment/Plan:    Patient with history of frequent sinus infections  She has had approximately five this year  Her symptoms at this time with an ongoing for greater than two weeks  Will start Augmentin  Discussed with patient she is to follow up with ENT for frequent sinusitis     Diagnoses and all orders for this visit:    Acute recurrent frontal sinusitis  -     amoxicillin-clavulanate (AUGMENTIN) 875-125 mg per tablet; Take 1 tablet by mouth every 12 (twelve) hours for 10 days        Subjective:      Patient ID: Lord Branch is a 34 y o  female  URI    This is a new problem  Episode onset: 2 weeks  The problem has been waxing and waning  Maximum temperature: subjective  Associated symptoms include congestion, coughing, ear pain, headaches, a plugged ear sensation, rhinorrhea, sinus pain and a sore throat  Pertinent negatives include no nausea, neck pain, vomiting or wheezing  Treatments tried: tylenol sinus/congestion and pseudefed  The treatment provided no relief  hx of frequent sinusitis    The following portions of the patient's history were reviewed and updated as appropriate: allergies, current medications, past family history, past medical history, past social history, past surgical history and problem list     Review of Systems   Constitutional: Positive for fever  Negative for chills and fatigue  HENT: Positive for congestion, ear pain, rhinorrhea, sinus pain and sore throat  Respiratory: Positive for cough  Negative for shortness of breath and wheezing  Gastrointestinal: Negative for nausea and vomiting  Musculoskeletal: Negative for neck pain  Neurological: Positive for headaches  Negative for dizziness and light-headedness           Past Medical History:   Diagnosis Date    Anxiety     Depression 5/4/2016    Diabetes mellitus ([facility] [address] )     NIDDM    Herpes simplex infection     Hypertension     Request for sterilization     Scratch     left lower arm- healing- scabbed    Seasonal allergies          Current Outpatient Prescriptions:     amLODIPine (NORVASC) 10 mg tablet, Take 10 mg by mouth every evening, Disp: , Rfl:     ascorbic acid (VITAMIN C) 500 mg tablet, Take 1,000 mg by mouth daily  , Disp: , Rfl:     cetirizine (ZyrTEC) 10 mg tablet, Take 10 mg by mouth daily, Disp: , Rfl:     Cholecalciferol (VITAMIN D3) 5000 units CAPS, Take 1 capsule by mouth daily, Disp: , Rfl:     citalopram (CeleXA) 40 mg tablet, Take 40 mg by mouth every evening, Disp: , Rfl:     Levonorgest-Eth Estrad 91-Day (SEASONIQUE PO), Take 1 tablet by mouth every evening, Disp: , Rfl:     metFORMIN (GLUCOPHAGE) 500 mg tablet, Take 500 mg by mouth daily with breakfast, Disp: , Rfl:     Naproxen Sodium (ALEVE PO), Take 1 tablet by mouth 2 (two) times a day as needed, Disp: , Rfl:     valACYclovir (VALTREX) 500 mg tablet, Take 500 mg by mouth daily as needed, Disp: , Rfl:     Allergies   Allergen Reactions    Percocet [Oxycodone-Acetaminophen] Itching     Category: Allergy;     Other      Annotation - [id number]: raw fruit and vegetables    Pollen Extract     Tramadol Itching       Social History   Past Surgical History:   Procedure Laterality Date     SECTION      OVARIAN CYST SURGERY      [place] LAP,TUBAL CAUTERY N/A 8/3/2017    Procedure: LIGATION/COAGULATION TUBAL LAPAROSCOPIC; LYSIS OF ADHESIONS;  Surgeon:  Beverly Noble DO;  Location: [facility];  Service: Gynecology    WISDOM TOOTH EXTRACTION       Family History   Problem Relation Age of Onset    Diabetes Mother     Hypertension Mother     Cancer Mother     Diabetes type II Father     Heart disease Father         cardiac disorder     Hypertension Father        Objective:  /80 (BP Location: Left arm, Patient Position: Sitting, Cuff Size: Standard)   Pulse 86   Temp 99 °F (37 2 °C) (Oral)   Ht 5' 6" (1 676 m)   Wt 99 2 kg (218 lb 9 6 oz)   SpO2 98%   BMI 35 28 kg/m²      Physical Exam   Constitutional: She is oriented to person, place, and time  She appears well-developed and well-nourished  No distress  HENT:   Head: Normocephalic and atraumatic  Right Ear: Hearing, tympanic membrane, external ear and ear canal normal    Left Ear: Hearing, tympanic membrane, external ear and ear canal normal    Nose: Mucosal edema and rhinorrhea present  Right sinus exhibits frontal sinus tenderness  Right sinus exhibits no maxillary sinus tenderness  Left sinus exhibits frontal sinus tenderness  Left sinus exhibits no maxillary sinus tenderness  Mouth/Throat: Uvula is midline, oropharynx is clear and moist and mucous membranes are normal    Cardiovascular: Normal rate and regular rhythm  Pulmonary/Chest: Effort normal and breath sounds normal  No respiratory distress  She has no wheezes  Neurological: She is alert and oriented to person, place, and time  Skin: Skin is warm and dry  No rash noted  Psychiatric: She has a normal mood and affect  Her behavior is normal  Judgment and thought content normal    Nursing note and vitals reviewed

## 2018-07-30 NOTE — PATIENT INSTRUCTIONS
You have been prescribed an antibiotic today  Take the full course of prescription  Recommend taking with food as may upset your stomach  Also would recommend OTC probiotic or yogurt to help protect the natural cliff of your stomach  Rest   Stay well hydrated  Return for new/worsening symptoms      If no improvement after 7 days of antibiotics may extend to full 10 days

## 2018-08-22 ENCOUNTER — OFFICE VISIT (OUTPATIENT)
Dept: INTERNAL MEDICINE CLINIC | Facility: CLINIC | Age: 38
End: 2018-08-22
Payer: COMMERCIAL

## 2018-08-22 VITALS
HEIGHT: 67 IN | SYSTOLIC BLOOD PRESSURE: 118 MMHG | WEIGHT: 215.8 LBS | DIASTOLIC BLOOD PRESSURE: 76 MMHG | RESPIRATION RATE: 98 BRPM | TEMPERATURE: 99.3 F | HEART RATE: 93 BPM | BODY MASS INDEX: 33.87 KG/M2

## 2018-08-22 DIAGNOSIS — K04.7 ABSCESS OF APEX OF DENTAL ROOT COMPLICATING CHRONIC INFLAMMATION: ICD-10-CM

## 2018-08-22 DIAGNOSIS — J32.0 RIGHT MAXILLARY SINUSITIS: ICD-10-CM

## 2018-08-22 DIAGNOSIS — E11.9 CONTROLLED TYPE 2 DIABETES MELLITUS WITHOUT COMPLICATION, WITHOUT LONG-TERM CURRENT USE OF INSULIN (HCC): Primary | ICD-10-CM

## 2018-08-22 PROCEDURE — 99213 OFFICE O/P EST LOW 20 MIN: CPT | Performed by: INTERNAL MEDICINE

## 2018-08-22 PROCEDURE — 1036F TOBACCO NON-USER: CPT | Performed by: INTERNAL MEDICINE

## 2018-08-22 RX ORDER — AMOXICILLIN 500 MG/1
500 CAPSULE ORAL EVERY 8 HOURS SCHEDULED
Qty: 30 CAPSULE | Refills: 0 | Status: SHIPPED | OUTPATIENT
Start: 2018-08-22 | End: 2018-09-01

## 2018-08-22 NOTE — ASSESSMENT & PLAN NOTE
Will initiate amoxicillin 500 mg t i d  For 10 days  The patient was strongly encouraged to seek out a dental evaluation

## 2018-08-22 NOTE — PROGRESS NOTES
Assessment/Plan:    Right maxillary sinusitis    Right maxillary sinus possibly due to an apical root infection  Dental evaluation was advised  Diabetes mellitus type 2, controlled, without complications ([facility] [address] )  Lab Results   Component Value Date    HGBA1C 6 6 (A) 03/16/2017     Recent diabetic fingerstick numbers have been mildly elevated from baseline, consistent with her  sinus/dental infection  No results for input(s): POCGLU in the last 72 hours  Blood Sugar Average: Last 72 hrs:      Abscess of apex of dental root complicating chronic inflammation    Will initiate amoxicillin 500 mg t i d  For 10 days  The patient was strongly encouraged to seek out a dental evaluation  Diagnoses and all orders for this visit:    Controlled type 2 diabetes mellitus without complication, without long-term current use of insulin (HCC)  -     amoxicillin (AMOXIL) 500 mg capsule; Take 1 capsule (500 mg total) by mouth every 8 (eight) hours for 10 days    Abscess of apex of dental root complicating chronic inflammation  -     amoxicillin (AMOXIL) 500 mg capsule; Take 1 capsule (500 mg total) by mouth every 8 (eight) hours for 10 days    Right maxillary sinusitis  -     amoxicillin (AMOXIL) 500 mg capsule; Take 1 capsule (500 mg total) by mouth every 8 (eight) hours for 10 days          Subjective:      Patient ID: Hernesto Haney is a 34 y o  female  Patient presents to the office after awakening this morning with swelling in the right side of the face  She has some chronic sinus issues but states that she has had a very sensitive tooth for several days and she has probably had some low-grade fever and night sweats for the past few days  This morning her discomfort became more acute so she came into the office to be evaluated          Family History   Problem Relation Age of Onset    Diabetes Mother     Hypertension Mother     Cancer Mother     Diabetes type II Father     Heart disease Father cardiac disorder     Hypertension Father      Social History     Social History    Marital status:      Spouse name: N/A    Number of children: N/A    Years of education: N/A     Occupational History    Not on file  Social History Main Topics    Smoking status: Former Smoker     Quit date: 1/31/2017    Smokeless tobacco: Never Used    Alcohol use Yes      Comment: 3x month    Drug use: No    Sexual activity: Not on file     Other Topics Concern    Not on file     Social History Narrative    No narrative on file     Past Medical History:   Diagnosis Date    Anxiety     Depression 5/4/2016    Diabetes mellitus ([facility] [address] )     NIDDM    Herpes simplex infection     Hypertension     Request for sterilization     Scratch     left lower arm- healing- scabbed    Seasonal allergies        Current Outpatient Prescriptions:     amLODIPine (NORVASC) 10 mg tablet, Take 10 mg by mouth every evening, Disp: , Rfl:     ascorbic acid (VITAMIN C) 500 mg tablet, Take 1,000 mg by mouth daily  , Disp: , Rfl:     cetirizine (ZyrTEC) 10 mg tablet, Take 10 mg by mouth daily, Disp: , Rfl:     Cholecalciferol (VITAMIN D3) 5000 units CAPS, Take 1 capsule by mouth daily, Disp: , Rfl:     citalopram (CeleXA) 40 mg tablet, Take 40 mg by mouth every evening, Disp: , Rfl:     Levonorgest-Eth Estrad 91-Day (SEASONIQUE PO), Take 1 tablet by mouth every evening, Disp: , Rfl:     metFORMIN (GLUCOPHAGE) 500 mg tablet, Take 500 mg by mouth daily with breakfast, Disp: , Rfl:     Naproxen Sodium (ALEVE PO), Take 1 tablet by mouth 2 (two) times a day as needed, Disp: , Rfl:     valACYclovir (VALTREX) 500 mg tablet, Take 500 mg by mouth daily as needed, Disp: , Rfl:     amoxicillin (AMOXIL) 500 mg capsule, Take 1 capsule (500 mg total) by mouth every 8 (eight) hours for 10 days, Disp: 30 capsule, Rfl: 0  Allergies   Allergen Reactions    Percocet [Oxycodone-Acetaminophen] Itching     Category:  Allergy;     Other Annotation - [id number]: raw fruit and vegetables    Pollen Extract     Tramadol Itching     Past Surgical History:   Procedure Laterality Date     SECTION      OVARIAN CYST SURGERY      [place] LAP,TUBAL CAUTERY N/A 8/3/2017    Procedure: LIGATION/COAGULATION TUBAL LAPAROSCOPIC; LYSIS OF ADHESIONS;  Surgeon: Beverly Noble DO;  Location: [facility] OR;  Service: Gynecology    WISDOM TOOTH EXTRACTION           Review of Systems   Constitutional: Positive for fatigue  HENT: Positive for facial swelling, postnasal drip, sinus pain and sinus pressure  Negative for mouth sores, rhinorrhea, sore throat and trouble swallowing  Eyes: Negative  Respiratory: Negative  Cardiovascular: Negative  Gastrointestinal: Negative  All other systems reviewed and are negative  Objective:      /76 (BP Location: Left arm, Patient Position: Sitting, Cuff Size: Large)   Pulse 93   Temp 99 3 °F (37 4 °C) (Oral)   Resp (!) 98   Ht 5' 7" (1 702 m)   Wt 97 9 kg (215 lb 12 8 oz)   BMI 33 80 kg/m²          Physical Exam   Constitutional: She is oriented to person, place, and time  She appears well-developed and well-nourished  No distress  HENT:   Head: Normocephalic and atraumatic  Right Ear: External ear normal    Left Ear: External ear normal    Nose: Mucosal edema and sinus tenderness present  Right sinus exhibits maxillary sinus tenderness  Left sinus exhibits no maxillary sinus tenderness and no frontal sinus tenderness  Mouth/Throat: Oropharynx is clear and moist     Dentition in in good repair  There did not appear to be any significant decay of the upper right teeth   Eyes: Conjunctivae are normal  Pupils are equal, round, and reactive to light  Right eye exhibits no discharge  Left eye exhibits no discharge  No scleral icterus  Neck: Neck supple  No JVD present  No tracheal deviation present  Cardiovascular: Normal rate and regular rhythm      Pulmonary/Chest: Effort normal  No respiratory distress  Abdominal: She exhibits no distension  Musculoskeletal: She exhibits no edema or deformity  Lymphadenopathy:     She has no cervical adenopathy  Neurological: She is alert and oriented to person, place, and time  Skin: Skin is warm and dry  Psychiatric: She has a normal mood and affect  Thought content normal    Vitals reviewed

## 2018-08-22 NOTE — ASSESSMENT & PLAN NOTE
Lab Results   Component Value Date    HGBA1C 6 6 (A) 03/16/2017     Recent diabetic fingerstick numbers have been mildly elevated from baseline, consistent with her  sinus/dental infection  No results for input(s): POCGLU in the last 72 hours      Blood Sugar Average: Last 72 hrs:

## 2018-10-23 PROCEDURE — 87624 HPV HI-RISK TYP POOLED RSLT: CPT | Performed by: OBSTETRICS & GYNECOLOGY

## 2018-10-23 PROCEDURE — G0145 SCR C/V CYTO,THINLAYER,RESCR: HCPCS | Performed by: OBSTETRICS & GYNECOLOGY

## 2018-10-24 ENCOUNTER — LAB REQUISITION (OUTPATIENT)
Dept: LAB | Facility: HOSPITAL | Age: 38
End: 2018-10-24
Payer: COMMERCIAL

## 2018-10-24 DIAGNOSIS — Z01.419 ENCOUNTER FOR GYNECOLOGICAL EXAMINATION WITHOUT ABNORMAL FINDING: ICD-10-CM

## 2018-10-29 LAB
HPV HR 12 DNA CVX QL NAA+PROBE: NEGATIVE
HPV16 DNA CVX QL NAA+PROBE: NEGATIVE
HPV18 DNA CVX QL NAA+PROBE: NEGATIVE

## 2018-10-31 LAB
LAB AP GYN PRIMARY INTERPRETATION: NORMAL
Lab: NORMAL

## 2018-11-12 PROCEDURE — 88305 TISSUE EXAM BY PATHOLOGIST: CPT | Performed by: PATHOLOGY

## 2018-11-13 ENCOUNTER — LAB REQUISITION (OUTPATIENT)
Dept: LAB | Facility: HOSPITAL | Age: 38
End: 2018-11-13
Payer: COMMERCIAL

## 2018-11-13 DIAGNOSIS — N92.6 IRREGULAR MENSTRUATION: ICD-10-CM

## 2019-09-17 LAB
LEFT EYE DIABETIC RETINOPATHY: NORMAL
RIGHT EYE DIABETIC RETINOPATHY: NORMAL
SEVERITY (EYE EXAM): NORMAL

## 2019-12-09 ENCOUNTER — TELEPHONE (OUTPATIENT)
Dept: INTERNAL MEDICINE CLINIC | Facility: CLINIC | Age: 39
End: 2019-12-09

## 2020-01-06 NOTE — TELEPHONE ENCOUNTER
I finally was able to leave a message for this patient to call the office to make an appt with Dr Cynthia Meier

## 2020-04-07 ENCOUNTER — TELEPHONE (OUTPATIENT)
Dept: INTERNAL MEDICINE CLINIC | Facility: CLINIC | Age: 40
End: 2020-04-07

## 2020-04-14 ENCOUNTER — TELEMEDICINE (OUTPATIENT)
Dept: INTERNAL MEDICINE CLINIC | Facility: CLINIC | Age: 40
End: 2020-04-14
Payer: COMMERCIAL

## 2020-04-14 VITALS — HEIGHT: 67 IN | WEIGHT: 211 LBS | BODY MASS INDEX: 33.12 KG/M2

## 2020-04-14 DIAGNOSIS — I10 BENIGN ESSENTIAL HTN: ICD-10-CM

## 2020-04-14 DIAGNOSIS — E78.2 MIXED HYPERLIPIDEMIA: ICD-10-CM

## 2020-04-14 DIAGNOSIS — E55.9 VITAMIN D INSUFFICIENCY: ICD-10-CM

## 2020-04-14 DIAGNOSIS — G89.29 CHRONIC PAIN OF LEFT KNEE: ICD-10-CM

## 2020-04-14 DIAGNOSIS — M25.562 CHRONIC PAIN OF LEFT KNEE: ICD-10-CM

## 2020-04-14 DIAGNOSIS — I10 ESSENTIAL HYPERTENSION: ICD-10-CM

## 2020-04-14 DIAGNOSIS — Z12.39 BREAST CANCER SCREENING: Primary | ICD-10-CM

## 2020-04-14 DIAGNOSIS — E11.9 CONTROLLED TYPE 2 DIABETES MELLITUS WITHOUT COMPLICATION, WITHOUT LONG-TERM CURRENT USE OF INSULIN (HCC): ICD-10-CM

## 2020-04-14 PROBLEM — A60.00 GENITAL HERPES SIMPLEX: Status: ACTIVE | Noted: 2020-04-14

## 2020-04-14 PROBLEM — J32.0 RIGHT MAXILLARY SINUSITIS: Status: RESOLVED | Noted: 2018-08-22 | Resolved: 2020-04-14

## 2020-04-14 PROBLEM — R68.89 FLU-LIKE SYMPTOMS: Status: RESOLVED | Noted: 2018-02-27 | Resolved: 2020-04-14

## 2020-04-14 PROBLEM — J02.0 STREP THROAT: Status: RESOLVED | Noted: 2018-03-02 | Resolved: 2020-04-14

## 2020-04-14 PROBLEM — K04.7 ABSCESS OF APEX OF DENTAL ROOT COMPLICATING CHRONIC INFLAMMATION: Status: RESOLVED | Noted: 2018-08-22 | Resolved: 2020-04-14

## 2020-04-14 PROBLEM — J20.9 ACUTE BRONCHITIS: Status: RESOLVED | Noted: 2018-02-23 | Resolved: 2020-04-14

## 2020-04-14 PROBLEM — E66.9 OBESITY: Status: ACTIVE | Noted: 2020-04-14

## 2020-04-14 PROCEDURE — 99214 OFFICE O/P EST MOD 30 MIN: CPT | Performed by: INTERNAL MEDICINE

## 2020-04-14 PROCEDURE — 3008F BODY MASS INDEX DOCD: CPT | Performed by: INTERNAL MEDICINE

## 2020-04-14 RX ORDER — AMLODIPINE BESYLATE 10 MG/1
10 TABLET ORAL EVERY EVENING
Qty: 90 TABLET | Refills: 1 | Status: SHIPPED | OUTPATIENT
Start: 2020-04-14 | End: 2020-11-03 | Stop reason: ALTCHOICE

## 2020-04-15 ENCOUNTER — TELEPHONE (OUTPATIENT)
Dept: ADMINISTRATIVE | Facility: OTHER | Age: 40
End: 2020-04-15

## 2020-05-28 ENCOUNTER — TELEPHONE (OUTPATIENT)
Dept: PEDIATRICS CLINIC | Facility: CLINIC | Age: 40
End: 2020-05-28

## 2020-07-06 ENCOUNTER — TELEPHONE (OUTPATIENT)
Dept: INTERNAL MEDICINE CLINIC | Facility: CLINIC | Age: 40
End: 2020-07-06

## 2020-07-06 NOTE — TELEPHONE ENCOUNTER
Called and lmom to call back to schedule her follow up with Dr John Nelson in July     Stated that when she calls to speak with Ruth Sinclair or Bruna Joyner

## 2020-07-20 NOTE — TELEPHONE ENCOUNTER
3 DAY TO 2 WEEK WELL CHILD EXAM  [address]    3 DAY-2 WEEK WELL CHILD EXAM      Gretchen is a 2 wk.o. old female infant.    History given by Mother    CONCERNS/QUESTIONS:   - had startle reflex while burping over the weekend, lasting 10-30 seconds, no color change   - some watering from one eye, no redness or swelling     Transition to Home:   Adjustment to new baby going well? Yes    BIRTH HISTORY:      Reviewed Birth history in EMR: Yes   Pertinent prenatal history: none  Delivery by: vaginal, spontaneous  GBS status of mother: Negative  Blood Type mother:O   Blood Type infant:A  Direct [place]: Negative  Received Hepatitis B vaccine at birth? Yes    SCREENINGS      NB HEARING SCREEN: Pass   SCREEN #1: Negative   SCREEN #2: pending  Selective screenings/ referral indicated? No    Bilirubin trending:   POC Results -   Lab Results   Component Value Date/Time    POCBILITOTTC 2020 1400     Lab Results - No results found for: TBILIRUBIN    Depression: Maternal No  [place]  Depression Scale Total: 0    GENERAL      NUTRITION HISTORY:   Breast feeding 3-4 times per day, plus pumped milk 2.5-3 oz by bottle for total frequency of q2-3 hours, slept 6 hours stretch last night     Not giving any other substances by mouth.    MULTIVITAMIN: Recommended Multivitamin with 400iu of Vitamin D po qd if exclusively  or taking less than 24 oz of formula a day.    ELIMINATION:   Has 6+ wet diapers per day, and has 6+ BM per day. BM is soft and yellow in color.    SLEEP PATTERN:   Wakes on own most of the time to feed? Yes  Wakes through out the night to feed? Yes  Sleeps in crib? Yes  Sleeps with parent? No  Sleeps on back? Yes    SOCIAL HISTORY:   The patient lives at home with mother, father, siblings, and does not attend day care. Has 2 siblings.  Smokers at home? No    HISTORY     Patient's medications, allergies, past medical, surgical, social and family  #2 lmom for patient to call back to schedule the follow up and physical with Dr Alfred Richey     Stated to ask to speak with Luis Daniel Olson or Celestine Barnes "histories were reviewed and updated as appropriate.  History reviewed. No pertinent past medical history.  There are no active problems to display for this patient.    No past surgical history on file.  History reviewed. No pertinent family history.  No current outpatient medications on file.     No current facility-administered medications for this visit.      No Known Allergies    REVIEW OF SYSTEMS      Constitutional: Afebrile, good appetite.   HENT: Negative for abnormal head shape.  Negative for any significant congestion.  Eyes: Negative for any discharge from eyes.  Respiratory: Negative for any difficulty breathing or noisy breathing.   Cardiovascular: Negative for changes in color/activity.   Gastrointestinal: Negative for vomiting or excessive spitting up, diarrhea, constipation. or blood in stool. No concerns about umbilical stump.   Genitourinary: Ample wet and poopy diapers .  Musculoskeletal: Negative for sign of arm pain or leg pain. Negative for any concerns for strength and or movement.   Skin: Negative for rash or skin infection.  Neurological: Negative for any lethargy or weakness.   Allergies: No known allergies.  Psychiatric/Behavioral: appropriate for age.   No Maternal Postpartum Depression     DEVELOPMENTAL SURVEILLANCE     Responds to sounds? Yes  Blinks in reaction to bright light? Yes  Fixes on face? Yes  Moves all extremities equally? Yes  Has periods of wakefulness? Yes  Meseret with discomfort? Yes  Calms to adult voice? Yes  Lifts head briefly when in tummy time? Yes  Keep hands in a fist? Yes    OBJECTIVE     PHYSICAL EXAM:   Reviewed vital signs and growth parameters in EMR.   Pulse 148   Temp 36.9 °C (98.4 °F) (Temporal)   Resp 42   Ht 0.521 m (1' 8.5\")   Wt 3.45 kg (7 lb 9.7 oz)   HC 34.8 cm (13.7\")   BMI 12.72 kg/m²   Length - 55 %ile (Z= 0.12) based on WHO (Girls, 0-2 years) Length-for-age data based on Length recorded on 2020.  Weight - 25 %ile (Z= -0.68) based on WHO " (Girls, 0-2 years) weight-for-age data using vitals from 2020.; Change from birth weight -4%  HC - 29 %ile (Z= -0.56) based on WHO (Girls, 0-2 years) head circumference-for-age based on Head Circumference recorded on 2020.    GENERAL: This is an alert, active  in no distress.   HEAD: Normocephalic, atraumatic. Anterior fontanelle is open, soft and flat.   EYES: PERRL, positive red reflex bilaterally. No conjunctival infection or discharge.   EARS: Ears symmetric  NOSE: Nares are patent and free of congestion.  THROAT: Palate intact. Vigorous suck.  NECK: Supple, no lymphadenopathy or masses. No palpable masses on bilateral clavicles.   HEART: Regular rate and rhythm without murmur.  Femoral pulses are 2+ and equal.   LUNGS: Clear bilaterally to auscultation, no wheezes or rhonchi. No retractions, nasal flaring, or distress noted.  ABDOMEN: Normal bowel sounds, soft and non-tender without hepatomegaly or splenomegaly or masses. Umbilical cord is off. Site is dry and non-erythematous.   GENITALIA: Normal female genitalia. No hernia. normal external genitalia, no erythema, no discharge.  MUSCULOSKELETAL: Hips have normal range of motion with negative Guardado and Ortolani. Spine is straight. Sacrum normal without dimple. Extremities are without abnormalities. Moves all extremities well and symmetrically with normal tone.    NEURO: Normal marika, palmar grasp, rooting. Vigorous suck.  SKIN: Intact without jaundice, significant rash or birthmarks. Skin is warm, dry, and pink.     ASSESSMENT: PLAN     1. Well Child Exam:  Healthy 2 wk.o. old  with good growth and development. Anticipatory guidance was reviewed and age appropriate Bright Futures handout was given.   2. Return to clinic for weight check in 1 week, as still -4% below birth weight with BF/pumped milk feeding. Gained 2 oz after weighted feed today.   3. Immunizations given today: None.  4. Second PKU screen at 2 weeks.    Return to clinic  for any of the following:   · Decreased wet or poopy diapers  · Decreased feeding  · Fever greater than 100.4 rectal   · Baby not waking up for feeds on her own most of time.   · Irritability  · Lethargy  · Dry sticky mouth.   · Any questions or concerns.

## 2020-08-27 NOTE — TELEPHONE ENCOUNTER
Letter was sent and numerous attempts made to contact patient with no success  Please advise what you want done

## 2020-09-01 NOTE — TELEPHONE ENCOUNTER
She has not had her labs done as far as we can tell  She had an abnormal Hgb A1C  6 YEARS ago which has not been followed up  If she does not want to come in for a follow up visit, fine, remove her from our practice list and send a letter terminating our relationship due to her noncompliance with medical follow up

## 2020-09-29 NOTE — TELEPHONE ENCOUNTER
09/29/20 3:40 PM     Thank you for your request  Your request has been received, reviewed, and the patient chart updated  The PCP has successfully been removed with a patient attribution note  This message will now be completed      Thank you  Laci Meraz

## 2020-11-03 ENCOUNTER — OFFICE VISIT (OUTPATIENT)
Dept: INTERNAL MEDICINE CLINIC | Facility: CLINIC | Age: 40
End: 2020-11-03
Payer: COMMERCIAL

## 2020-11-03 VITALS
BODY MASS INDEX: 33.72 KG/M2 | OXYGEN SATURATION: 98 % | TEMPERATURE: 99 F | HEIGHT: 66 IN | DIASTOLIC BLOOD PRESSURE: 80 MMHG | WEIGHT: 209.8 LBS | HEART RATE: 84 BPM | SYSTOLIC BLOOD PRESSURE: 134 MMHG

## 2020-11-03 DIAGNOSIS — M72.0 DUPUYTREN'S CONTRACTURE OF BOTH HANDS: ICD-10-CM

## 2020-11-03 DIAGNOSIS — E78.2 MIXED HYPERLIPIDEMIA: ICD-10-CM

## 2020-11-03 DIAGNOSIS — E11.9 CONTROLLED TYPE 2 DIABETES MELLITUS WITHOUT COMPLICATION, WITHOUT LONG-TERM CURRENT USE OF INSULIN (HCC): Primary | ICD-10-CM

## 2020-11-03 DIAGNOSIS — I10 BENIGN ESSENTIAL HTN: ICD-10-CM

## 2020-11-03 LAB — SL AMB POCT HEMOGLOBIN AIC: 6.3 (ref ?–6.5)

## 2020-11-03 PROCEDURE — 3044F HG A1C LEVEL LT 7.0%: CPT | Performed by: INTERNAL MEDICINE

## 2020-11-03 PROCEDURE — 3725F SCREEN DEPRESSION PERFORMED: CPT | Performed by: INTERNAL MEDICINE

## 2020-11-03 PROCEDURE — 3075F SYST BP GE 130 - 139MM HG: CPT | Performed by: INTERNAL MEDICINE

## 2020-11-03 PROCEDURE — 3079F DIAST BP 80-89 MM HG: CPT | Performed by: INTERNAL MEDICINE

## 2020-11-03 PROCEDURE — 99214 OFFICE O/P EST MOD 30 MIN: CPT | Performed by: INTERNAL MEDICINE

## 2020-11-03 PROCEDURE — 83036 HEMOGLOBIN GLYCOSYLATED A1C: CPT | Performed by: INTERNAL MEDICINE

## 2020-11-03 PROCEDURE — 3008F BODY MASS INDEX DOCD: CPT | Performed by: INTERNAL MEDICINE

## 2020-11-03 RX ORDER — ATORVASTATIN CALCIUM 10 MG/1
10 TABLET, FILM COATED ORAL DAILY
Qty: 30 TABLET | Refills: 5 | Status: SHIPPED | OUTPATIENT
Start: 2020-11-03 | End: 2021-10-19

## 2020-11-03 RX ORDER — AMLODIPINE BESYLATE AND BENAZEPRIL HYDROCHLORIDE 5; 10 MG/1; MG/1
1 CAPSULE ORAL DAILY
Qty: 30 CAPSULE | Refills: 5 | Status: SHIPPED | OUTPATIENT
Start: 2020-11-03 | End: 2021-01-15 | Stop reason: SINTOL

## 2020-11-05 ENCOUNTER — TELEPHONE (OUTPATIENT)
Dept: INTERNAL MEDICINE CLINIC | Facility: CLINIC | Age: 40
End: 2020-11-05

## 2021-01-07 ENCOUNTER — TELEPHONE (OUTPATIENT)
Dept: INTERNAL MEDICINE CLINIC | Facility: CLINIC | Age: 41
End: 2021-01-07

## 2021-01-07 NOTE — TELEPHONE ENCOUNTER
Patient called stating after beginning the Lotrel she started getting "migraine-like headaches"  So she decided to stop the Lotrel and start the amlodipine 10mg daily and since then the headaches have subsided  PT will be in need of a refill soon and is wanting to know if it is OK to d/c the Lotrel and resume the Amlodipine 10mg daily  Will forward to Dr Dheeraj Rosas to place order if appropriate

## 2021-01-11 ENCOUNTER — TELEPHONE (OUTPATIENT)
Dept: INTERNAL MEDICINE CLINIC | Facility: CLINIC | Age: 41
End: 2021-01-11

## 2021-01-11 NOTE — TELEPHONE ENCOUNTER
Patient scheduled Saturday, May 8th for a 6 month follow up  Left message that Dr Aury Landry will now not be doing Saturdays for the time being and requested a call back to reschedule

## 2021-01-15 DIAGNOSIS — I10 BENIGN ESSENTIAL HTN: Primary | ICD-10-CM

## 2021-01-15 RX ORDER — AMLODIPINE BESYLATE 10 MG/1
10 TABLET ORAL DAILY
COMMUNITY
Start: 2021-01-07 | End: 2021-01-15 | Stop reason: SDUPTHER

## 2021-01-15 RX ORDER — AMLODIPINE BESYLATE 10 MG/1
10 TABLET ORAL DAILY
Qty: 90 TABLET | Refills: 1 | Status: SHIPPED | OUTPATIENT
Start: 2021-01-15 | End: 2021-09-15 | Stop reason: SDUPTHER

## 2021-01-15 RX ORDER — LEVONORGESTREL AND ETHINYL ESTRADIOL AND ETHINYL ESTRADIOL 150-30(84)
1 KIT ORAL DAILY
COMMUNITY
Start: 2020-11-05

## 2021-01-19 NOTE — TELEPHONE ENCOUNTER
Called patient and LMOM to continue Amlodipine 10 mg daily  RX sent to [facility] [place] on 1/15/21

## 2021-01-28 ENCOUNTER — OFFICE VISIT (OUTPATIENT)
Dept: URGENT CARE | Age: 41
End: 2021-01-28
Payer: COMMERCIAL

## 2021-01-28 VITALS
SYSTOLIC BLOOD PRESSURE: 129 MMHG | TEMPERATURE: 98.6 F | OXYGEN SATURATION: 99 % | HEART RATE: 95 BPM | WEIGHT: 210 LBS | DIASTOLIC BLOOD PRESSURE: 76 MMHG | RESPIRATION RATE: 18 BRPM | HEIGHT: 68 IN | BODY MASS INDEX: 31.83 KG/M2

## 2021-01-28 DIAGNOSIS — N30.01 ACUTE CYSTITIS WITH HEMATURIA: Primary | ICD-10-CM

## 2021-01-28 LAB
SL AMB  POCT GLUCOSE, UA: NEGATIVE
SL AMB LEUKOCYTE ESTERASE,UA: ABNORMAL
SL AMB POCT BILIRUBIN,UA: NEGATIVE
SL AMB POCT BLOOD,UA: ABNORMAL
SL AMB POCT CLARITY,UA: ABNORMAL
SL AMB POCT COLOR,UA: YELLOW
SL AMB POCT KETONES,UA: 5
SL AMB POCT NITRITE,UA: NEGATIVE
SL AMB POCT PH,UA: 7.5
SL AMB POCT SPECIFIC GRAVITY,UA: 1.01
SL AMB POCT URINE HCG: NEGATIVE
SL AMB POCT URINE PROTEIN: 300
SL AMB POCT UROBILINOGEN: 0.2

## 2021-01-28 PROCEDURE — 99213 OFFICE O/P EST LOW 20 MIN: CPT | Performed by: PHYSICIAN ASSISTANT

## 2021-01-28 PROCEDURE — 81025 URINE PREGNANCY TEST: CPT | Performed by: PHYSICIAN ASSISTANT

## 2021-01-28 PROCEDURE — 87147 CULTURE TYPE IMMUNOLOGIC: CPT | Performed by: PHYSICIAN ASSISTANT

## 2021-01-28 PROCEDURE — 81002 URINALYSIS NONAUTO W/O SCOPE: CPT | Performed by: PHYSICIAN ASSISTANT

## 2021-01-28 PROCEDURE — 87086 URINE CULTURE/COLONY COUNT: CPT | Performed by: PHYSICIAN ASSISTANT

## 2021-01-28 RX ORDER — FLUCONAZOLE 150 MG/1
150 TABLET ORAL ONCE
Qty: 1 TABLET | Refills: 1 | Status: SHIPPED | OUTPATIENT
Start: 2021-01-28 | End: 2021-01-28

## 2021-01-28 RX ORDER — PHENAZOPYRIDINE HYDROCHLORIDE 100 MG/1
100 TABLET, FILM COATED ORAL 3 TIMES DAILY PRN
Qty: 10 TABLET | Refills: 0 | Status: SHIPPED | OUTPATIENT
Start: 2021-01-28 | End: 2022-06-24

## 2021-01-28 RX ORDER — NITROFURANTOIN 25; 75 MG/1; MG/1
100 CAPSULE ORAL 2 TIMES DAILY
Qty: 14 CAPSULE | Refills: 0 | Status: SHIPPED | OUTPATIENT
Start: 2021-01-28 | End: 2021-02-04

## 2021-01-28 NOTE — LETTER
January 28, 2021     Patient: Nick Georges   YOB: 1980   Date of Visit: 1/28/2021       To Whom it May Concern:    Rayray Ayers was seen in my clinic on 1/28/2021  If you have any questions or concerns, please don't hesitate to call           Sincerely,          Pretty Boyd PA-C        CC: No Recipients

## 2021-01-28 NOTE — PATIENT INSTRUCTIONS
Begin antibiotic as directed  Take with probiotic or yogurt to prevent stomach upset  Begin Pyridium as needed to help with bladder spasming, painful urination  Continue to stay very well hydrated  Diflucan as needed if yeast infection develops  Follow-up with primary care physician 3-5 days for continue symptoms    Urinary Tract Infection in Women   WHAT YOU NEED TO KNOW:   A urinary tract infection (UTI) is caused by bacteria that get inside your urinary tract  Most bacteria that enter your urinary tract come out when you urinate  If the bacteria stay in your urinary tract, you may get an infection  Your urinary tract includes your kidneys, ureters, bladder, and urethra  Urine is made in your kidneys, and it flows from the ureters to the bladder  Urine leaves the bladder through the urethra  A UTI is more common in your lower urinary tract, which includes your bladder and urethra  DISCHARGE INSTRUCTIONS:   Return to the emergency department if:   · You are urinating very little or not at all  · You have a high fever with shaking chills  · You have side or back pain that gets worse  Call your doctor if:   · You have a fever  · You do not feel better after 2 days of taking antibiotics  · You are vomiting  · You have questions or concerns about your condition or care  Medicines:   · Antibiotics  help fight a bacterial infection  If you have UTIs often (called recurrent UTIs), you may be given antibiotics to take regularly  You will be given directions for when and how to use antibiotics  The goal is to prevent UTIs but not cause antibiotic resistance by using antibiotics too often  · Medicines  may be given to decrease pain and burning when you urinate  They will also help decrease the feeling that you need to urinate often  These medicines will make your urine orange or red  · Take your medicine as directed    Contact your healthcare provider if you think your medicine is not helping or if you have side effects  Tell him or her if you are allergic to any medicine  Keep a list of the medicines, vitamins, and herbs you take  Include the amounts, and when and why you take them  Bring the list or the pill bottles to follow-up visits  Carry your medicine list with you in case of an emergency  Follow up with your healthcare provider as directed:  Write down your questions so you remember to ask them during your visits  Prevent another UTI:   · Empty your bladder often  Urinate and empty your bladder as soon as you feel the need  Do not hold your urine for long periods of time  · Wipe from front to back after you urinate or have a bowel movement  This will help prevent germs from getting into your urinary tract through your urethra  · Drink liquids as directed  Ask how much liquid to drink each day and which liquids are best for you  You may need to drink more liquids than usual to help flush out the bacteria  Do not drink alcohol, caffeine, or citrus juices  These can irritate your bladder and increase your symptoms  Your healthcare provider may recommend cranberry juice to help prevent a UTI  · Urinate after you have sex  This can help flush out bacteria passed during sex  · Do not douche or use feminine deodorants  These can change the chemical balance in your vagina  · Change sanitary pads or tampons often  This will help prevent germs from getting into your urinary tract  · Talk to your healthcare provider about your birth control method  You may need to change your method if it is increasing your risk for UTIs  · Wear cotton underwear and clothes that are loose  Tight pants and nylon underwear can trap moisture and cause bacteria to grow  · Vaginal estrogen may be recommended  This medicine helps prevent UTIs in women who have gone through menopause or are in hermila-menopause  · Do pelvic muscle exercises often    Pelvic muscle exercises may help you start and stop urinating  Strong pelvic muscles may help you empty your bladder easier  Squeeze these muscles tightly for 5 seconds like you are trying to hold back urine  Then relax for 5 seconds  Gradually work up to squeezing for 10 seconds  Do 3 sets of 15 repetitions a day, or as directed  © Copyright [address] Information is for End User's use only and may not be sold, redistributed or otherwise used for commercial purposes  All illustrations and images included in CareNotes® are the copyrighted property of [employer]  or [address] [place], [place] [address]and[address]   The above information is an  only  It is not intended as medical advice for individual conditions or treatments  Talk to your doctor, nurse or pharmacist before following any medical regimen to see if it is safe and effective for you

## 2021-01-29 LAB
BACTERIA UR CULT: ABNORMAL
BACTERIA UR CULT: ABNORMAL

## 2021-02-16 NOTE — PROGRESS NOTES
[facility]        NAME: Santino Kirby is a 41 y o  female  : 1980    MRN: [id number]  DATE: 2021  TIME: 11:43 AM    Assessment and Plan   Acute cystitis with hematuria [N30 01]  1  Acute cystitis with hematuria  POCT urine HCG    POCT urine dip    nitrofurantoin (MACROBID) 100 mg capsule    phenazopyridine (PYRIDIUM) 100 mg tablet    fluconazole (DIFLUCAN) 150 mg tablet    Urine culture         Patient Instructions     Begin antibiotic as directed  Take with probiotic or yogurt to prevent stomach upset  Begin Pyridium as needed to help with bladder spasming, painful urination  Continue to stay very well hydrated  Diflucan as needed if yeast infection develops  Follow-up with primary care physician 3-5 days for continue symptoms  Follow up with PCP in 3-5 days  Proceed to  ER if symptoms worsen  Chief Complaint     Chief Complaint   Patient presents with    Back Pain     PT REPORTS LOW BACK PAIN THAT STARTED TODAY  Agustina King +PAINFUL URINATION  PT THOUGHT SHE HAD YEAST INFECTION AND TREATED WITH DIFLUCAN AND MONISTAT BUT STATES SHE CONTINUES TO HAVE ITCHINESS AND BURNING          History of Present Illness       41year old female presents to the office for c/o of lower back pain that began today  In addition the patient has had dyuria x 5 days  She initially though she had a yeast infection as she has had some white milky vaginal discharge without order  She tried Diflucan and mono stat however without relief  She described burning with urination, lower abdominal pain, bloated, and lower back pain that is now new  She had one episode of nausea and chills associated as well  Heat to the lower back has helped  Standing make the pain worse  She is standing constantly at her job at [employer]  She is due for her menstural period this upcoming week  She is taking oral birth control  Back Pain  This is a new problem  The current episode started today   The problem is unchanged  The pain is present in the lumbar spine (bilateral )  The quality of the pain is described as stabbing  The pain does not radiate  The pain is at a severity of 4/10  The symptoms are aggravated by standing (pt does admit that she stands all day at work )  Associated symptoms include abdominal pain and dysuria  Pertinent negatives include no bladder incontinence, bowel incontinence, fever, numbness, paresthesias, perianal numbness or tingling  (Denies difficulties with walking or sleeping due to pain ) Treatments tried: Diflucan, monostat  The treatment provided no relief  Review of Systems   Review of Systems   Constitutional: Negative for fever  Gastrointestinal: Positive for abdominal pain  Negative for bowel incontinence  Genitourinary: Positive for dysuria  Negative for bladder incontinence  Musculoskeletal: Positive for back pain  Neurological: Negative for tingling, numbness and paresthesias           Current Medications       Current Outpatient Medications:     amLODIPine (NORVASC) 10 mg tablet, Take 1 tablet (10 mg total) by mouth daily, Disp: 90 tablet, Rfl: 1    ascorbic acid (VITAMIN C) 500 mg tablet, Take 1,000 mg by mouth daily  , Disp: , Rfl:     Ashlyna 0 15-0 03 &0 01 MG TABS, Take 1 tablet by mouth daily, Disp: , Rfl:     cetirizine (ZyrTEC) 10 mg tablet, Take 10 mg by mouth daily, Disp: , Rfl:     Cholecalciferol (VITAMIN D3) 5000 units CAPS, Take 1 capsule by mouth daily, Disp: , Rfl:     citalopram (CeleXA) 40 mg tablet, Take 40 mg by mouth every evening, Disp: , Rfl:     metFORMIN (GLUCOPHAGE) 500 mg tablet, Take 1 tablet (500 mg total) by mouth daily with breakfast, Disp: 90 tablet, Rfl: 1    Naproxen Sodium (ALEVE PO), Take 2 tablets by mouth daily , Disp: , Rfl:     valACYclovir (VALTREX) 500 mg tablet, Take 500 mg by mouth daily as needed, Disp: , Rfl:     atorvastatin (LIPITOR) 10 mg tablet, Take 1 tablet (10 mg total) by mouth daily (Patient not taking: Reported on 2021), Disp: 30 tablet, Rfl: 5    fluconazole (DIFLUCAN) 150 mg tablet, Take 1 tablet (150 mg total) by mouth once for 1 dose, Disp: 1 tablet, Rfl: 1    nitrofurantoin (MACROBID) 100 mg capsule, Take 1 capsule (100 mg total) by mouth 2 (two) times a day for 7 days, Disp: 14 capsule, Rfl: 0    phenazopyridine (PYRIDIUM) 100 mg tablet, Take 1 tablet (100 mg total) by mouth 3 (three) times a day as needed for bladder spasms, Disp: 10 tablet, Rfl: 0    Current Allergies     Allergies as of 2021 - Reviewed 2021   Allergen Reaction Noted    Lotrel [amlodipine besy-benazepril hcl] Headache 2021    Percocet [oxycodone-acetaminophen] Itching 2016    Hydrocodone Itching 2020    Other  2016    Oxycodone Itching 2020    Pollen extract  2016    Tramadol Itching 2018            The following portions of the patient's history were reviewed and updated as appropriate: allergies, current medications, past family history, past medical history, past social history, past surgical history and problem list      Past Medical History:   Diagnosis Date    Abscess of apex of dental root complicating chronic inflammation 2018    Acute bronchitis 2018    Anxiety     Depression 2016    Diabetes mellitus ([facility] [address] )     NIDDM    Flu-like symptoms 2018    Herpes simplex infection     Hypertension     Obesity 2020    Request for sterilization     Right maxillary sinusitis 2018    Scratch     left lower arm- healing- scabbed    Seasonal allergies     Strep throat 3/2/2018       Past Surgical History:   Procedure Laterality Date     SECTION      OVARIAN CYST SURGERY      [place] LAP,TUBAL CAUTERY N/A 8/3/2017    Procedure: LIGATION/COAGULATION TUBAL LAPAROSCOPIC; LYSIS OF ADHESIONS;  Surgeon:  Beverly Noble DO;  Location: [facility];  Service: Gynecology    WISDOM TOOTH EXTRACTION         Family History   Problem Relation Age of Onset    Diabetes Mother     Hypertension Mother     Cancer Mother     Diabetes type II Father     Heart disease Father         cardiac disorder     Hypertension Father          Medications have been verified  Objective   /76   Pulse 95   Temp 98 6 °F (37 °C)   Resp 18   Ht 5' 8" (1 727 m)   Wt 95 3 kg (210 lb)   SpO2 99%   BMI 31 93 kg/m²   No LMP recorded  Physical Exam     Physical Exam  Vitals signs and nursing note reviewed  Constitutional:       General: She is not in acute distress  Appearance: Normal appearance  She is well-developed  She is not diaphoretic  Comments: Pleasant female    HENT:      Head: Normocephalic and atraumatic  Right Ear: Hearing and external ear normal       Left Ear: Hearing and external ear normal       Nose: Nose normal  No mucosal edema or rhinorrhea  Right Sinus: No maxillary sinus tenderness or frontal sinus tenderness  Left Sinus: No maxillary sinus tenderness or frontal sinus tenderness  Mouth/Throat:      Lips: Pink  Mouth: Mucous membranes are moist       Dentition: No dental caries  Pharynx: Uvula midline  No oropharyngeal exudate, posterior oropharyngeal erythema or uvula swelling  Tonsils: No tonsillar exudate or tonsillar abscesses  Eyes:      General: Lids are normal  No scleral icterus  Right eye: No discharge  Left eye: No discharge  Conjunctiva/sclera: Conjunctivae normal       Pupils: Pupils are equal, round, and reactive to light  Cardiovascular:      Rate and Rhythm: Normal rate and regular rhythm  Heart sounds: Normal heart sounds, S1 normal and S2 normal  No murmur  No S3 or S4 sounds  Pulmonary:      Effort: Pulmonary effort is normal  No respiratory distress  Breath sounds: Normal breath sounds  No stridor  No wheezing, rhonchi or rales  Abdominal:      General: Abdomen is flat  Bowel sounds are normal  There is no distension  Palpations: Abdomen is soft  Abdomen is not rigid  Tenderness: There is abdominal tenderness in the suprapubic area  There is no right CVA tenderness, left CVA tenderness, guarding or rebound  Musculoskeletal:      Lumbar back: She exhibits tenderness ( bilateral paraspinal muscles ) and pain  She exhibits normal range of motion, no bony tenderness, no swelling and no edema  Comments:  Active range of motion within normal limits, strength 5 out 5 bilateral lower extremities  Sensation intact bilateral upper extremities  Gait nonantalgic  Straight leg raise negative bilaterally   Lymphadenopathy:      Cervical: No cervical adenopathy  Skin:     General: Skin is warm and dry  Coloration: Skin is not pale  Findings: No rash  Neurological:      Mental Status: She is alert  Psychiatric:         Behavior: Behavior is cooperative  no chest pain/no palpitations/no dyspnea on exertion

## 2021-03-18 DIAGNOSIS — E11.9 CONTROLLED TYPE 2 DIABETES MELLITUS WITHOUT COMPLICATION, WITHOUT LONG-TERM CURRENT USE OF INSULIN (HCC): ICD-10-CM

## 2021-08-16 ENCOUNTER — HOSPITAL ENCOUNTER (OUTPATIENT)
Dept: RADIOLOGY | Facility: HOSPITAL | Age: 41
Discharge: HOME | End: 2021-08-16
Payer: COMMERCIAL

## 2021-08-16 ENCOUNTER — OFFICE VISIT (OUTPATIENT)
Dept: OBGYN CLINIC | Facility: HOSPITAL | Age: 41
End: 2021-08-16
Payer: COMMERCIAL

## 2021-08-16 VITALS
SYSTOLIC BLOOD PRESSURE: 150 MMHG | HEART RATE: 92 BPM | DIASTOLIC BLOOD PRESSURE: 81 MMHG | HEIGHT: 68 IN | WEIGHT: 211.6 LBS | BODY MASS INDEX: 32.07 KG/M2

## 2021-08-16 DIAGNOSIS — M72.0 DUPUYTREN CONTRACTURE: ICD-10-CM

## 2021-08-16 DIAGNOSIS — M72.0 DUPUYTREN'S DISEASE OF PALM OF BOTH HANDS: ICD-10-CM

## 2021-08-16 DIAGNOSIS — M79.642 PAIN IN BOTH HANDS: ICD-10-CM

## 2021-08-16 DIAGNOSIS — M79.641 PAIN IN BOTH HANDS: Primary | ICD-10-CM

## 2021-08-16 DIAGNOSIS — M79.642 PAIN IN BOTH HANDS: Primary | ICD-10-CM

## 2021-08-16 DIAGNOSIS — M79.641 PAIN IN BOTH HANDS: ICD-10-CM

## 2021-08-16 PROCEDURE — 3008F BODY MASS INDEX DOCD: CPT | Performed by: PHYSICIAN ASSISTANT

## 2021-08-16 PROCEDURE — 99203 OFFICE O/P NEW LOW 30 MIN: CPT | Performed by: PHYSICIAN ASSISTANT

## 2021-08-16 PROCEDURE — 73130 X-RAY EXAM OF HAND: CPT

## 2021-08-16 PROCEDURE — 1036F TOBACCO NON-USER: CPT | Performed by: PHYSICIAN ASSISTANT

## 2021-08-16 NOTE — PROGRESS NOTES
Assessment/Plan   Diagnoses and all orders for this visit:    Pain in both hands    Dupuytren contracture, mild, multiple fingers    Dupuytren's disease of palm of both hands with nodules      - Follow up with hand specialist        Subjective   Patient ID: Nadine Green is a 35 y o  female  Vitals:    21 1819   BP: 150/81   Pulse: 80     42yo female comes in for an evaluation of her b/l hands  She c/o multiple palmar nodules that have appeared over the year or so  She has one in the left palm and many in the right  Sometimes she has soreness in the palms  She has also noticed decreased extension of the right ring and small fingers  The pain is dull in character, mild in severity, pain does not radiate and is not associated with numbness    The following portions of the patient's history were reviewed and updated as appropriate: allergies, current medications, past family history, past medical history, past social history, past surgical history and problem list     The following portions of the patient's history were reviewed and updated as appropriate: allergies, current medications, past family history, past medical history, past social history, past surgical history and problem list     Review of Systems  Ortho Exam  Past Medical History:   Diagnosis Date    Abscess of apex of dental root complicating chronic inflammation 2018    Acute bronchitis 2018    Anxiety     Depression 2016    Diabetes mellitus ([facility] [address] )     NIDDM    Flu-like symptoms 2018    Herpes simplex infection     Hypertension     Obesity 2020    Request for sterilization     Right maxillary sinusitis 2018    Scratch     left lower arm- healing- scabbed    Seasonal allergies     Strep throat 3/2/2018     Past Surgical History:   Procedure Laterality Date     SECTION      OVARIAN CYST SURGERY      [place] LAP,TUBAL CAUTERY N/A 8/3/2017    Procedure: LIGATION/COAGULATION TUBAL LAPAROSCOPIC; LYSIS OF ADHESIONS;  Surgeon: Ruby Sanchez DO;  Location: [facility];  Service: Gynecology    WISDOM TOOTH EXTRACTION       Family History   Problem Relation Age of Onset    Diabetes Mother     Hypertension Mother     Cancer Mother     Diabetes type II Father     Heart disease Father         cardiac disorder     Hypertension Father      Social History     Occupational History    Not on file   Tobacco Use    Smoking status: Former Smoker     Quit date: 2017     Years since quittin 5    Smokeless tobacco: Never Used   Vaping Use    Vaping Use: Never used   Substance and Sexual Activity    Alcohol use: Yes     Comment: social    Drug use: No    Sexual activity: Yes       Review of Systems   Constitutional: Negative  HENT: Negative  Eyes: Negative  Respiratory: Negative  Cardiovascular: Negative  Gastrointestinal: Negative  Endocrine: Negative  Genitourinary: Negative  Musculoskeletal: As below      Allergic/Immunologic: Negative  Neurological: Negative  Hematological: Negative  Psychiatric/Behavioral: Negative  Objective   Physical Exam    · Constitutional: Awake, Alert, Oriented  · Eyes: EOMI  · Psych: Mood and affect appropriate  · Heart: regular rate and rhythm  · Lungs: No audible wheezing  · Abdomen: soft  · Lymph: no lymphedema   bilateral hand:  - Appearance   No swelling, discoloration, deformity, or ecchymosis  - Palpation  o Multiple (5+) dupuytrens nodules in the right palm  One in the left  o Palpable cords in the long, ring, and small fingers as well as the first webspace  b/l  - ROM  o She lacks a few degrees of extension of the right small finger MCP  - Motor  o 5/5 flexion, 5/5 extension  - Special Tests  o normal sensation of hand and arm  - NVI distally    I have personally reviewed pertinent films in PACS and my interpretation is no acute displaced fracture

## 2021-09-13 ENCOUNTER — OFFICE VISIT (OUTPATIENT)
Dept: OBGYN CLINIC | Facility: CLINIC | Age: 41
End: 2021-09-13
Payer: COMMERCIAL

## 2021-09-13 VITALS
WEIGHT: 209.8 LBS | DIASTOLIC BLOOD PRESSURE: 84 MMHG | HEIGHT: 68 IN | BODY MASS INDEX: 31.8 KG/M2 | HEART RATE: 87 BPM | SYSTOLIC BLOOD PRESSURE: 127 MMHG

## 2021-09-13 DIAGNOSIS — M72.0 DUPUYTREN'S DISEASE OF PALM OF BOTH HANDS: Primary | ICD-10-CM

## 2021-09-13 PROCEDURE — 99213 OFFICE O/P EST LOW 20 MIN: CPT | Performed by: SURGERY

## 2021-09-13 PROCEDURE — 3008F BODY MASS INDEX DOCD: CPT | Performed by: SURGERY

## 2021-09-13 PROCEDURE — 1036F TOBACCO NON-USER: CPT | Performed by: SURGERY

## 2021-09-13 NOTE — PROGRESS NOTES
ASSESSMENT/PLAN:      35 y o  female with mild bilateral Dupuytren's disease  The etiology of Dupuytren's disease was discussed today along with treatment options  Jeremiah Juan was advised to keep an eye on her fingers for flexion contractures  If this occurs and is more then aprox  30 degrees at the MP joint, Xiaflex may be discussed  Trama and or hand surgery can worsen the disease  Heavy alcohol consumption and smoking can also worsen the disease  For the painful nodules, she may use Voltaren Gel, up to 4x a day  Briefly discussed a localized CSI to the painful nodule, which can worsen the disease  May use Voltaren Gel over painful arthritic joints along with heat  Follow up in the office as needed if symptoms worsen or fail to improve  The patient verbalized understanding of exam findings and treatment plan  We engaged in the shared decision-making process and treatment options were discussed at length with the patient  Surgical and conservative management discussed today along with risks and benefits  Diagnoses and all orders for this visit:    Dupuytren's disease of palm of both hands      Follow Up:  Return if symptoms worsen or fail to improve  To Do Next Visit:  Re-evaluation of current issue    ____________________________________________________________________________________________________________________________________________      CHIEF COMPLAINT:  Chief Complaint   Patient presents with    Left Hand - Pain    Right Hand - Pain       SUBJECTIVE:  Teja Mercado is a 35y o  year old RHD female who presents to the office today for bilateral hand nodules  I am seeing Jeremiah Martinez in consultation at the request of Avni Grullon notes nodules ot the palmar aspect of both hands starting aprox  1 year ago  She does have a family history of Dupuytren's disease  Yadi notes the left hand nodules can be painful at times, specifically with gripping   Jeremiah Martinez will take Aleve on an as needed basis, which is beneficial for her  I have personally reviewed all the relevant PMH, PSH, SH, FH, Medications and allergies  PAST MEDICAL HISTORY:  Past Medical History:   Diagnosis Date    Abscess of apex of dental root complicating chronic inflammation 2018    Acute bronchitis 2018    Anxiety     Depression 2016    Diabetes mellitus ([facility] [address] )     NIDDM    Flu-like symptoms 2018    Herpes simplex infection     Hypertension     Obesity 2020    Request for sterilization     Right maxillary sinusitis 2018    Scratch     left lower arm- healing- scabbed    Seasonal allergies     Strep throat 3/2/2018       PAST SURGICAL HISTORY:  Past Surgical History:   Procedure Laterality Date     SECTION      OVARIAN CYST SURGERY      NJ LAP,TUBAL CAUTERY N/A 8/3/2017    Procedure: LIGATION/COAGULATION TUBAL LAPAROSCOPIC; LYSIS OF ADHESIONS;  Surgeon:  Beverly Noble DO;  Location[facility];  Service: Gynecology    WISDOM TOOTH EXTRACTION         FAMILY HISTORY:  Family History   Problem Relation Age of Onset    Diabetes Mother     Hypertension Mother     Cancer Mother     Diabetes type II Father     Heart disease Father         cardiac disorder     Hypertension Father        SOCIAL HISTORY:  Social History     Tobacco Use    Smoking status: Former Smoker     Quit date: 2017     Years since quittin 6    Smokeless tobacco: Never Used   Vaping Use    Vaping Use: Never used   Substance Use Topics    Alcohol use: Yes     Comment: social    Drug use: No       MEDICATIONS:    Current Outpatient Medications:     amLODIPine (NORVASC) 10 mg tablet, Take 1 tablet (10 mg total) by mouth daily, Disp: 90 tablet, Rfl: 1    ascorbic acid (VITAMIN C) 500 mg tablet, Take 1,000 mg by mouth daily  , Disp: , Rfl:     Ashlyna 0 15-0 03 &0 01 MG TABS, Take 1 tablet by mouth daily, Disp: , Rfl:     cetirizine (ZyrTEC) 10 mg tablet, Take 10 mg by mouth daily, Disp: , Rfl:     Cholecalciferol (VITAMIN D3) 5000 units CAPS, Take 1 capsule by mouth daily, Disp: , Rfl:     citalopram (CeleXA) 40 mg tablet, Take 40 mg by mouth every evening, Disp: , Rfl:     metFORMIN (GLUCOPHAGE) 500 mg tablet, TAKE ONE TABLET BY MOUTH DAILY with breakfast, Disp: 90 tablet, Rfl: 0    Naproxen Sodium (ALEVE PO), Take 2 tablets by mouth daily , Disp: , Rfl:     valACYclovir (VALTREX) 500 mg tablet, Take 500 mg by mouth daily as needed, Disp: , Rfl:     atorvastatin (LIPITOR) 10 mg tablet, Take 1 tablet (10 mg total) by mouth daily (Patient not taking: Reported on 1/28/2021), Disp: 30 tablet, Rfl: 5    phenazopyridine (PYRIDIUM) 100 mg tablet, Take 1 tablet (100 mg total) by mouth 3 (three) times a day as needed for bladder spasms (Patient not taking: Reported on 8/16/2021), Disp: 10 tablet, Rfl: 0    ALLERGIES:  Allergies   Allergen Reactions    Lotrel [Amlodipine Besy-Benazepril Hcl] Headache     Migraine headaches (she can take Amlodipine with no side effects)    Percocet [Oxycodone-Acetaminophen] Itching     Category: Allergy;     Hydrocodone Itching    Other      Annotation - [id number]: raw fruit and vegetables    Oxycodone Itching    Pollen Extract     Tramadol Itching       REVIEW OF SYSTEMS:  Review of Systems   Constitutional: Negative for chills, fever and unexpected weight change  HENT: Negative for hearing loss, nosebleeds and sore throat  Eyes: Negative for pain, redness and visual disturbance  Respiratory: Negative for cough, shortness of breath and wheezing  Cardiovascular: Negative for chest pain, palpitations and leg swelling  Gastrointestinal: Negative for abdominal pain, nausea and vomiting  Endocrine: Negative for polydipsia and polyuria  Genitourinary: Negative for difficulty urinating and hematuria  Musculoskeletal: Negative for arthralgias, joint swelling and myalgias  Skin: Negative for rash and wound     Neurological: Negative for dizziness, numbness and headaches  Psychiatric/Behavioral: Negative for decreased concentration, dysphoric mood and suicidal ideas  The patient is not nervous/anxious  VITALS:  Vitals:    09/13/21 1010   BP: 127/84   Pulse: 87       LABS:  HgA1c:   Lab Results   Component Value Date    HGBA1C 6 3 11/03/2020     BMP:   Lab Results   Component Value Date    GLUCOSE 119 05/27/2014    CALCIUM 9 5 05/27/2014     (L) 05/27/2014    K 3 7 05/27/2014    CO2 27 05/27/2014    CL 98 (L) 05/27/2014    BUN 13 05/27/2014    CREATININE 0 65 05/27/2014       _____________________________________________________  PHYSICAL EXAMINATION:  General: well developed and well nourished, alert, oriented times 3 and appears comfortable  Psychiatric: Normal  HEENT: Normocephalic, Atraumatic Trachea Midline, No torticollis  Pulmonary: No audible wheezing or respiratory distress   Cardiovascular: No pitting edema, 2+ radial pulse   Abdominal/GI: abdomen non tender, non distended   Skin: No Erythema, No Lacerations, No Fluctuation, No Ulcerations  Neurovascular: Sensation Intact to the Median, Ulnar, Radial Nerve, Motor Intact to the Median, Ulnar, Radial Nerve and Pulses Intact  Musculoskeletal: Normal, except as noted in detailed exam and in HPI        MUSCULOSKELETAL EXAMINATION:    Bilateral hands:     No erythema, ecchymosis or edema  Dupuytren's nodules in line with ring fingers   No PIP or MCP involvement at this time   - table top test   Full composite fist     ___________________________________________________  STUDIES REVIEWED:  I have personally reviewed AP lateral and oblique radiographs of Bilateral hand   which demonstrate  No acute fracture dislocation no significant degenerative changes           PROCEDURES PERFORMED:  Procedures  No Procedures performed today    _____________________________________________________      Jacobo Loveless    I,:  Benjamin Clarke am acting as a scribe while in the presence of the attending physician :       I,:  Isabela Leger MD personally performed the services described in this documentation    as scribed in my presence :

## 2021-09-15 DIAGNOSIS — I10 BENIGN ESSENTIAL HTN: ICD-10-CM

## 2021-09-15 DIAGNOSIS — E11.9 CONTROLLED TYPE 2 DIABETES MELLITUS WITHOUT COMPLICATION, WITHOUT LONG-TERM CURRENT USE OF INSULIN (HCC): ICD-10-CM

## 2021-09-15 NOTE — TELEPHONE ENCOUNTER
Last ov 11/3/2020  Next ov - not scheduled    Called patient and LMOM to call back and schedule an overdue 6 m f/u visit within the next 30 days

## 2021-09-16 RX ORDER — AMLODIPINE BESYLATE 10 MG/1
10 TABLET ORAL DAILY
Qty: 30 TABLET | Refills: 0 | Status: SHIPPED | OUTPATIENT
Start: 2021-09-16 | End: 2022-06-24

## 2021-10-19 ENCOUNTER — OFFICE VISIT (OUTPATIENT)
Dept: INTERNAL MEDICINE CLINIC | Facility: CLINIC | Age: 41
End: 2021-10-19
Payer: COMMERCIAL

## 2021-10-19 VITALS
WEIGHT: 213.6 LBS | TEMPERATURE: 98.5 F | DIASTOLIC BLOOD PRESSURE: 80 MMHG | RESPIRATION RATE: 20 BRPM | OXYGEN SATURATION: 98 % | BODY MASS INDEX: 32.37 KG/M2 | HEART RATE: 89 BPM | SYSTOLIC BLOOD PRESSURE: 160 MMHG | HEIGHT: 68 IN

## 2021-10-19 DIAGNOSIS — E78.2 MIXED HYPERLIPIDEMIA: ICD-10-CM

## 2021-10-19 DIAGNOSIS — Z12.31 ENCOUNTER FOR SCREENING MAMMOGRAM FOR MALIGNANT NEOPLASM OF BREAST: ICD-10-CM

## 2021-10-19 DIAGNOSIS — E11.9 CONTROLLED TYPE 2 DIABETES MELLITUS WITHOUT COMPLICATION, WITHOUT LONG-TERM CURRENT USE OF INSULIN (HCC): Primary | ICD-10-CM

## 2021-10-19 DIAGNOSIS — I10 BENIGN ESSENTIAL HTN: ICD-10-CM

## 2021-10-19 DIAGNOSIS — K30 DELAYED GASTRIC EMPTYING: ICD-10-CM

## 2021-10-19 LAB — SL AMB POCT HEMOGLOBIN AIC: 6.8 (ref ?–6.5)

## 2021-10-19 PROCEDURE — 99214 OFFICE O/P EST MOD 30 MIN: CPT | Performed by: INTERNAL MEDICINE

## 2021-10-19 PROCEDURE — 3008F BODY MASS INDEX DOCD: CPT | Performed by: INTERNAL MEDICINE

## 2021-10-19 PROCEDURE — 3079F DIAST BP 80-89 MM HG: CPT | Performed by: INTERNAL MEDICINE

## 2021-10-19 PROCEDURE — 1036F TOBACCO NON-USER: CPT | Performed by: INTERNAL MEDICINE

## 2021-10-19 PROCEDURE — 3044F HG A1C LEVEL LT 7.0%: CPT | Performed by: INTERNAL MEDICINE

## 2021-10-19 PROCEDURE — 3077F SYST BP >= 140 MM HG: CPT | Performed by: INTERNAL MEDICINE

## 2021-10-19 PROCEDURE — 83036 HEMOGLOBIN GLYCOSYLATED A1C: CPT | Performed by: INTERNAL MEDICINE

## 2021-10-19 RX ORDER — ATORVASTATIN CALCIUM 10 MG/1
10 TABLET, FILM COATED ORAL DAILY
Qty: 30 TABLET | Refills: 5 | Status: SHIPPED | OUTPATIENT
Start: 2021-10-19 | End: 2022-04-21 | Stop reason: SDUPTHER

## 2021-10-19 RX ORDER — AMLODIPINE AND VALSARTAN 5; 160 MG/1; MG/1
1 TABLET ORAL DAILY
Qty: 30 TABLET | Refills: 5 | Status: SHIPPED | OUTPATIENT
Start: 2021-10-19 | End: 2022-04-21 | Stop reason: SDUPTHER

## 2021-10-19 RX ORDER — AMLODIPINE BESYLATE 10 MG/1
10 TABLET ORAL DAILY
Qty: 90 TABLET | Refills: 1 | Status: CANCELLED | OUTPATIENT
Start: 2021-10-19

## 2021-10-19 RX ORDER — AMOXICILLIN AND CLAVULANATE POTASSIUM 875; 125 MG/1; MG/1
1 TABLET, FILM COATED ORAL 2 TIMES DAILY
COMMUNITY
Start: 2021-10-13 | End: 2021-10-23

## 2021-10-21 ENCOUNTER — TELEPHONE (OUTPATIENT)
Dept: INTERNAL MEDICINE CLINIC | Facility: CLINIC | Age: 41
End: 2021-10-21

## 2021-11-23 ENCOUNTER — TELEPHONE (OUTPATIENT)
Dept: INTERNAL MEDICINE CLINIC | Facility: CLINIC | Age: 41
End: 2021-11-23

## 2022-01-27 ENCOUNTER — TELEPHONE (OUTPATIENT)
Dept: INTERNAL MEDICINE CLINIC | Facility: OTHER | Age: 42
End: 2022-01-27

## 2022-01-27 NOTE — TELEPHONE ENCOUNTER
Spoke to patient    She has an appt 3/2022 with [facility] for her Diabetic eye Exam

## 2022-02-07 ENCOUNTER — HOSPITAL ENCOUNTER (OUTPATIENT)
Dept: RADIOLOGY | Age: 42
Discharge: HOME | End: 2022-02-07
Payer: COMMERCIAL

## 2022-02-07 VITALS — BODY MASS INDEX: 32.28 KG/M2 | HEIGHT: 68 IN | WEIGHT: 213 LBS

## 2022-02-07 DIAGNOSIS — Z12.31 ENCOUNTER FOR SCREENING MAMMOGRAM FOR MALIGNANT NEOPLASM OF BREAST: ICD-10-CM

## 2022-02-07 PROCEDURE — 77063 BREAST TOMOSYNTHESIS BI: CPT

## 2022-02-07 PROCEDURE — 77067 SCR MAMMO BI INCL CAD: CPT

## 2022-02-22 ENCOUNTER — HOSPITAL ENCOUNTER (OUTPATIENT)
Dept: ULTRASOUND IMAGING | Facility: CLINIC | Age: 42
Discharge: HOME | End: 2022-02-22
Payer: COMMERCIAL

## 2022-02-22 ENCOUNTER — HOSPITAL ENCOUNTER (OUTPATIENT)
Dept: MAMMOGRAPHY | Facility: CLINIC | Age: 42
Discharge: HOME | End: 2022-02-22
Payer: COMMERCIAL

## 2022-02-22 VITALS — BODY MASS INDEX: 32.28 KG/M2 | WEIGHT: 213 LBS | HEIGHT: 68 IN

## 2022-02-22 DIAGNOSIS — R92.8 ABNORMAL MAMMOGRAM: ICD-10-CM

## 2022-02-22 PROCEDURE — 76642 ULTRASOUND BREAST LIMITED: CPT

## 2022-02-22 PROCEDURE — G0279 TOMOSYNTHESIS, MAMMO: HCPCS

## 2022-02-22 PROCEDURE — 77065 DX MAMMO INCL CAD UNI: CPT

## 2022-04-21 ENCOUNTER — TELEPHONE (OUTPATIENT)
Dept: INTERNAL MEDICINE CLINIC | Facility: CLINIC | Age: 42
End: 2022-04-21

## 2022-04-21 DIAGNOSIS — I10 BENIGN ESSENTIAL HTN: ICD-10-CM

## 2022-04-21 DIAGNOSIS — E11.9 CONTROLLED TYPE 2 DIABETES MELLITUS WITHOUT COMPLICATION, WITHOUT LONG-TERM CURRENT USE OF INSULIN (HCC): ICD-10-CM

## 2022-04-21 RX ORDER — AMLODIPINE AND VALSARTAN 5; 160 MG/1; MG/1
1 TABLET ORAL DAILY
Qty: 30 TABLET | Refills: 5 | Status: SHIPPED | OUTPATIENT
Start: 2022-04-21 | End: 2022-07-29 | Stop reason: SDUPTHER

## 2022-04-21 RX ORDER — ATORVASTATIN CALCIUM 10 MG/1
10 TABLET, FILM COATED ORAL DAILY
Qty: 30 TABLET | Refills: 5 | Status: SHIPPED | OUTPATIENT
Start: 2022-04-21 | End: 2022-07-29 | Stop reason: SDUPTHER

## 2022-04-21 NOTE — TELEPHONE ENCOUNTER
Patient needs   Amlodipine 5 mgs  Atorvastatin 10 mg  Valsartan 160mg  Metformin 500    Radha Cande    Patient had to work today and had to cancel her appt for today      Patient rescheduled for 05/10/2022

## 2022-06-10 ENCOUNTER — TELEPHONE (OUTPATIENT)
Dept: ADMINISTRATIVE | Facility: OTHER | Age: 42
End: 2022-06-10

## 2022-06-10 NOTE — LETTER
Diabetic Eye Exam Form    Date Requested: 06/10/22  Patient: Jackie Foley  Patient : 1980   Referring Provider: Alize Rojas MD    DIABETIC Eye Exam Date _______________________________    Type of Exam MUST be documented for Diabetic Eye Exams  Please CHECK ONE  Retinal Exam       Dilated Retinal Exam       OCT       Optomap-Iris Exam      Fundus Photography     Left Eye - Please check Retinopathy AND Type or No Retinopathy      Exam did show retinopathy    Exam did not show retinopathy         Mild     Proliferative           Moderate    Severe            None         Right Eye - Please check Retinopathy AND Type or No Retinopathy     Exam did show retinopathy    Exam did not show retinopathy         Mild     Proliferative        Moderate    Severe        None       Comments __________________________________________________________    Practice Providing Exam ______________________________________________    Exam Performed By (print name) _______________________________________      Provider Signature ___________________________________________________    These reports are needed for  compliance  Please fax this completed form and a copy of the Diabetic Eye Exam report to our office located at [place] [address] as soon as possible via [phone] attention Sylvia: Phone [phone]  We thank you for your assistance in treating our mutual patient

## 2022-06-10 NOTE — TELEPHONE ENCOUNTER
Upon review of the In Basket request we have found as a result of outreach that patient did not have the requested item(s) completed  Has not been seen at this facility     Any additional questions or concerns should be emailed to the Practice Liaisons via [email or web address] email, please do not reply via In Basket      Thank you  Toya Garcia MA

## 2022-06-10 NOTE — TELEPHONE ENCOUNTER
----- Message from [facility] sent at 6/9/2022  1:30 PM EDT -----  06/09/22 1:30 PM    Hello, our patient Aimee Suarez has had Diabetic Eye Exam completed/performed  Please assist in updating the patient chart by making an External outreach to [place] [address] located in   The date of service is 3/2022      Thank you,  [address] [place] [address]

## 2022-06-10 NOTE — TELEPHONE ENCOUNTER
Upon review of the In Basket request and the patient's chart, initial outreach has been made via fax, please see Contacts section for details       Thank you  Diane Aguirre MA

## 2022-06-20 ENCOUNTER — TELEPHONE (OUTPATIENT)
Dept: ADMINISTRATIVE | Facility: OTHER | Age: 42
End: 2022-06-20

## 2022-06-20 NOTE — TELEPHONE ENCOUNTER
Upon review of the In Basket request and the patient's chart, initial outreach has been made via fax, please see Contacts section for details       Thank you  Ivan Godinez MA

## 2022-06-20 NOTE — TELEPHONE ENCOUNTER
----- Message from [facility] sent at 6/17/2022 12:14 PM EDT -----  06/17/22 12:14 PM    Hello, our patient Vicente Orellana has had Diabetic Eye Exam completed/performed  Please assist in updating the patient chart by making an External outreach to [facility] facility located in   The date of service is 6/16/2022      Thank you,  [address] [facility]

## 2022-06-20 NOTE — LETTER
Diabetic Eye Exam Form    Date Requested: 22  Patient: Karlyn Kayser  Patient : 1980   Referring Provider: Johny Cantor MD    DIABETIC Eye Exam Date _______________________________    Type of Exam MUST be documented for Diabetic Eye Exams  Please CHECK ONE  Retinal Exam       Dilated Retinal Exam       OCT       Optomap-Iris Exam      Fundus Photography     Left Eye - Please check Retinopathy AND Type or No Retinopathy      Exam did show retinopathy    Exam did not show retinopathy         Mild     Proliferative           Moderate    Severe            None         Right Eye - Please check Retinopathy AND Type or No Retinopathy     Exam did show retinopathy    Exam did not show retinopathy         Mild     Proliferative        Moderate    Severe        None       Comments __________________________________________________________    Practice Providing Exam ______________________________________________    Exam Performed By (print name) _______________________________________      Provider Signature ___________________________________________________    These reports are needed for  compliance  Please fax this completed form and a copy of the Diabetic Eye Exam report to our office located at [place] [address] as soon as possible via [phone] rhiannon Wilkerson: Phone [phone]  We thank you for your assistance in treating our mutual patient

## 2022-06-20 NOTE — TELEPHONE ENCOUNTER
Upon review of the In Basket request we have found as a result of outreach that patient did not have the requested item(s) completed  Any additional questions or concerns should be emailed to the Practice Liaisons via [email or web address] email, please do not reply via In Basket      Thank you  Adolfo Genao MA

## 2022-06-27 ENCOUNTER — TELEPHONE (OUTPATIENT)
Dept: ADMINISTRATIVE | Facility: OTHER | Age: 42
End: 2022-06-27

## 2022-06-27 NOTE — TELEPHONE ENCOUNTER
Upon review of the In Basket request we were able to locate, review, and update the patient chart as requested for Hepatitis C  and HIV  Any additional questions or concerns should be emailed to the Practice Liaisons via [email or web address] email, please do not reply via In Basket      Thank you  Becka Galindo

## 2022-06-27 NOTE — TELEPHONE ENCOUNTER
----- Message from Rainer Lemus sent at 6/24/2022  7:05 PM EDT -----  Regarding: Hep C & HIV - [facility]  06/24/22 7:05 PM    Hello, our patient Aimee Suarez has had Hepatitis C completed/performed  Please assist in updating the patient chart by pulling the Care Everywhere (CE) document  The date of service is 09/05/2013  Thank you,  Rainer Lemus  [facility]       06/24/22 7:05 PM    Hello, our patient Aimee Suarez has had HIV completed/performed  Please assist in updating the patient chart by pulling the Care Everywhere (CE) document  The date of service is 09/05/2013       Thank you,  Rainer Lemus  [facility]

## 2022-07-27 ENCOUNTER — APPOINTMENT (OUTPATIENT)
Dept: LAB | Age: 42
End: 2022-07-27
Payer: COMMERCIAL

## 2022-07-27 DIAGNOSIS — I10 BENIGN ESSENTIAL HTN: ICD-10-CM

## 2022-07-27 DIAGNOSIS — E78.2 MIXED HYPERLIPIDEMIA: ICD-10-CM

## 2022-07-27 DIAGNOSIS — E11.9 CONTROLLED TYPE 2 DIABETES MELLITUS WITHOUT COMPLICATION, WITHOUT LONG-TERM CURRENT USE OF INSULIN (HCC): ICD-10-CM

## 2022-07-27 LAB
ALBUMIN SERPL BCP-MCNC: 3.6 G/DL (ref 3.5–5)
ALP SERPL-CCNC: 62 U/L (ref 46–116)
ALT SERPL W P-5'-P-CCNC: 13 U/L (ref 12–78)
ANION GAP SERPL CALCULATED.3IONS-SCNC: 9 MMOL/L (ref 4–13)
AST SERPL W P-5'-P-CCNC: 15 U/L (ref 5–45)
BILIRUB SERPL-MCNC: 0.3 MG/DL (ref 0.2–1)
BUN SERPL-MCNC: 12 MG/DL (ref 5–25)
CALCIUM SERPL-MCNC: 9.6 MG/DL (ref 8.3–10.1)
CHLORIDE SERPL-SCNC: 107 MMOL/L (ref 96–108)
CHOLEST SERPL-MCNC: 214 MG/DL
CO2 SERPL-SCNC: 24 MMOL/L (ref 21–32)
CREAT SERPL-MCNC: 0.91 MG/DL (ref 0.6–1.3)
CREAT UR-MCNC: 159 MG/DL
ERYTHROCYTE [DISTWIDTH] IN BLOOD BY AUTOMATED COUNT: 12.3 % (ref 11.6–15.1)
EST. AVERAGE GLUCOSE BLD GHB EST-MCNC: 154 MG/DL
GFR SERPL CREATININE-BSD FRML MDRD: 76 ML/MIN/1.73SQ M
GLUCOSE P FAST SERPL-MCNC: 144 MG/DL (ref 65–99)
HBA1C MFR BLD: 7 %
HCT VFR BLD AUTO: 38.4 % (ref 34.8–46.1)
HDLC SERPL-MCNC: 48 MG/DL
HGB BLD-MCNC: 12.7 G/DL (ref 11.5–15.4)
LDLC SERPL CALC-MCNC: 111 MG/DL (ref 0–100)
MCH RBC QN AUTO: 28.7 PG (ref 26.8–34.3)
MCHC RBC AUTO-ENTMCNC: 33.1 G/DL (ref 31.4–37.4)
MCV RBC AUTO: 87 FL (ref 82–98)
MICROALBUMIN UR-MCNC: 62.7 MG/L (ref 0–20)
MICROALBUMIN/CREAT 24H UR: 39 MG/G CREATININE (ref 0–30)
NONHDLC SERPL-MCNC: 166 MG/DL
PLATELET # BLD AUTO: 188 THOUSANDS/UL (ref 149–390)
PMV BLD AUTO: 13.4 FL (ref 8.9–12.7)
POTASSIUM SERPL-SCNC: 4.6 MMOL/L (ref 3.5–5.3)
PROT SERPL-MCNC: 7.6 G/DL (ref 6.4–8.4)
RBC # BLD AUTO: 4.42 MILLION/UL (ref 3.81–5.12)
SODIUM SERPL-SCNC: 140 MMOL/L (ref 135–147)
TRIGL SERPL-MCNC: 276 MG/DL
WBC # BLD AUTO: 9.44 THOUSAND/UL (ref 4.31–10.16)

## 2022-07-27 PROCEDURE — 83036 HEMOGLOBIN GLYCOSYLATED A1C: CPT

## 2022-07-27 PROCEDURE — 80061 LIPID PANEL: CPT

## 2022-07-27 PROCEDURE — 85027 COMPLETE CBC AUTOMATED: CPT

## 2022-07-27 PROCEDURE — 36415 COLL VENOUS BLD VENIPUNCTURE: CPT

## 2022-07-27 PROCEDURE — 82570 ASSAY OF URINE CREATININE: CPT

## 2022-07-27 PROCEDURE — 82043 UR ALBUMIN QUANTITATIVE: CPT

## 2022-07-27 PROCEDURE — 80053 COMPREHEN METABOLIC PANEL: CPT

## 2022-07-29 ENCOUNTER — OFFICE VISIT (OUTPATIENT)
Dept: INTERNAL MEDICINE CLINIC | Facility: CLINIC | Age: 42
End: 2022-07-29
Payer: COMMERCIAL

## 2022-07-29 VITALS
TEMPERATURE: 99.2 F | DIASTOLIC BLOOD PRESSURE: 74 MMHG | BODY MASS INDEX: 35.07 KG/M2 | WEIGHT: 218.2 LBS | SYSTOLIC BLOOD PRESSURE: 132 MMHG | HEIGHT: 66 IN | OXYGEN SATURATION: 99 % | HEART RATE: 94 BPM

## 2022-07-29 DIAGNOSIS — K30 DELAYED GASTRIC EMPTYING: Primary | ICD-10-CM

## 2022-07-29 DIAGNOSIS — I10 BENIGN ESSENTIAL HTN: ICD-10-CM

## 2022-07-29 DIAGNOSIS — M15.9 PRIMARY OSTEOARTHRITIS INVOLVING MULTIPLE JOINTS: ICD-10-CM

## 2022-07-29 DIAGNOSIS — E11.9 CONTROLLED TYPE 2 DIABETES MELLITUS WITHOUT COMPLICATION, WITHOUT LONG-TERM CURRENT USE OF INSULIN (HCC): ICD-10-CM

## 2022-07-29 PROBLEM — M15.0 PRIMARY OSTEOARTHRITIS INVOLVING MULTIPLE JOINTS: Status: ACTIVE | Noted: 2022-07-29

## 2022-07-29 PROCEDURE — 99214 OFFICE O/P EST MOD 30 MIN: CPT | Performed by: INTERNAL MEDICINE

## 2022-07-29 RX ORDER — ATORVASTATIN CALCIUM 10 MG/1
10 TABLET, FILM COATED ORAL DAILY
Qty: 90 TABLET | Refills: 1 | Status: SHIPPED | OUTPATIENT
Start: 2022-07-29

## 2022-07-29 RX ORDER — OMEPRAZOLE 20 MG/1
20 CAPSULE, DELAYED RELEASE ORAL DAILY
Qty: 90 CAPSULE | Refills: 1 | Status: SHIPPED | OUTPATIENT
Start: 2022-07-29

## 2022-07-29 RX ORDER — SEMAGLUTIDE 1.34 MG/ML
INJECTION, SOLUTION SUBCUTANEOUS
Qty: 1.5 ML | Refills: 5 | Status: SHIPPED | OUTPATIENT
Start: 2022-07-29 | End: 2022-10-07

## 2022-07-29 RX ORDER — AMLODIPINE AND VALSARTAN 5; 160 MG/1; MG/1
1 TABLET ORAL DAILY
Qty: 90 TABLET | Refills: 1 | Status: SHIPPED | OUTPATIENT
Start: 2022-07-29 | End: 2023-01-25

## 2022-07-29 RX ORDER — CELECOXIB 100 MG/1
100 CAPSULE ORAL 2 TIMES DAILY
Qty: 180 CAPSULE | Refills: 1 | Status: SHIPPED | OUTPATIENT
Start: 2022-07-29

## 2022-07-30 NOTE — ASSESSMENT & PLAN NOTE
Will discontinue naproxen and trial the patient on Celebrex 100 mg twice daily along with omeprazole 20 mg daily

## 2022-07-30 NOTE — ASSESSMENT & PLAN NOTE
Lab Results   Component Value Date    HGBA1C 7 0 (H) 07/27/2022   Hemoglobin A1c is up from previous but still controlled  Patient would like to trial Ozempic  We will initiated low-dose 0 25 mg weekly increase as tolerated  Patient understands this medication may slow gastric emptying to a greater degree than what she is experiencing now

## 2022-07-30 NOTE — PROGRESS NOTES
Assessment/Plan:    Delayed gastric emptying  Will schedule a gastric emptying study with nuclear medicine    Benign essential HTN  Blood pressure is adequately controlled on current medications, amlodipine valsartan    Diabetes mellitus type 2, controlled, without complications ([facility])    Lab Results   Component Value Date    HGBA1C 7 0 (H) 07/27/2022   Hemoglobin A1c is up from previous but still controlled  Patient would like to trial Ozempic  We will initiated low-dose 0 25 mg weekly increase as tolerated  Patient understands this medication may slow gastric emptying to a greater degree than what she is experiencing now  Primary osteoarthritis involving multiple joints  Will discontinue naproxen and trial the patient on Celebrex 100 mg twice daily along with omeprazole 20 mg daily       Diagnoses and all orders for this visit:    Delayed gastric emptying  -     Semaglutide,0 25 or 0 5MG/DOS, (Ozempic, 0 25 or 0 5 MG/DOSE,) 2 MG/1 5ML SOPN; Inject 0 25 mg under the skin once a week for 28 days, THEN 0 5 mg once a week  -     NM gastric emptying; Future    Controlled type 2 diabetes mellitus without complication, without long-term current use of insulin ([facility])  -     amLODIPine-valsartan (EXFORGE) 5-160 MG per tablet; Take 1 tablet by mouth daily  -     atorvastatin (LIPITOR) 10 mg tablet; Take 1 tablet (10 mg total) by mouth daily  -     metFORMIN (GLUCOPHAGE) 500 mg tablet; Take 1 tablet (500 mg total) by mouth daily with breakfast  -     Semaglutide,0 25 or 0 5MG/DOS, (Ozempic, 0 25 or 0 5 MG/DOSE,) 2 MG/1 5ML SOPN; Inject 0 25 mg under the skin once a week for 28 days, THEN 0 5 mg once a week  -     NM gastric emptying; Future    Benign essential HTN  -     amLODIPine-valsartan (EXFORGE) 5-160 MG per tablet; Take 1 tablet by mouth daily    Primary osteoarthritis involving multiple joints  -     omeprazole (PriLOSEC) 20 mg delayed release capsule;  Take 1 capsule (20 mg total) by mouth daily  - celecoxib (CeleBREX) 100 mg capsule; Take 1 capsule (100 mg total) by mouth 2 (two) times a day          Subjective:      Patient ID: Esa Ivory is a 38 y o  female  Patient presents to the office for follow-up visit  She has been experiencing increased arthritic pains in her hands and fingers and had been taking naproxen along with over-the-counter omeprazole but was beginning to experience some increased abdominal discomfort  She also has significant issues with gastric emptying at these symptoms to have been increasing in severity  She has not been experiencing any nausea or vomiting she denies any changes in the color her stools  Abdominal discomfort is intermittent but with increasing frequency associated with meals      Family History   Problem Relation Age of Onset    Diabetes Mother     Hypertension Mother     Cancer Mother     Uterine cancer Mother 72    Diabetes type II Father     Heart disease Father         cardiac disorder     Hypertension Father     No Known Problems Maternal Grandmother     No Known Problems Maternal Grandfather     No Known Problems Paternal Grandmother     Colon cancer Paternal Grandfather     No Known Problems Son     No Known Problems Maternal Aunt     No Known Problems Maternal Aunt     No Known Problems Maternal Aunt     No Known Problems Maternal Aunt     No Known Problems Maternal Aunt     No Known Problems Paternal Aunt      Social History     Socioeconomic History    Marital status:      Spouse name: Not on file    Number of children: Not on file    Years of education: Not on file    Highest education level: Not on file   Occupational History    Not on file   Tobacco Use    Smoking status: Former Smoker     Quit date: 2017     Years since quittin 4    Smokeless tobacco: Never Used   Vaping Use    Vaping Use: Never used   Substance and Sexual Activity    Alcohol use: Yes     Comment: 2 drinks per month    Drug use:  No  Sexual activity: Yes   Other Topics Concern    Not on file   Social History Narrative    Not on file     Social Determinants of Health     Financial Resource Strain: Not on file   Food Insecurity: Not on file   Transportation Needs: Not on file   Physical Activity: Not on file   Stress: Not on file   Social Connections: Not on file   Intimate Partner Violence: Not on file   Housing Stability: Not on file     Past Medical History:   Diagnosis Date    Abscess of apex of dental root complicating chronic inflammation 8/22/2018    Acute bronchitis 2/23/2018    Anxiety     Depression 5/4/2016    Diabetes mellitus ([facility] [address] )     NIDDM    Flu-like symptoms 2/27/2018    Herpes simplex infection     Hypertension     Obesity 4/14/2020    Primary osteoarthritis involving multiple joints 7/29/2022    Request for sterilization     Right maxillary sinusitis 8/22/2018    Scratch     left lower arm- healing- scabbed    Seasonal allergies     Strep throat 3/2/2018       Current Outpatient Medications:     amLODIPine-valsartan (EXFORGE) 5-160 MG per tablet, Take 1 tablet by mouth daily, Disp: 90 tablet, Rfl: 1    ascorbic acid (VITAMIN C) 500 mg tablet, Take 1,000 mg by mouth daily  , Disp: , Rfl:     Ashlyna 0 15-0 03 &0 01 MG TABS, Take 1 tablet by mouth daily, Disp: , Rfl:     atorvastatin (LIPITOR) 10 mg tablet, Take 1 tablet (10 mg total) by mouth daily, Disp: 90 tablet, Rfl: 1    celecoxib (CeleBREX) 100 mg capsule, Take 1 capsule (100 mg total) by mouth 2 (two) times a day, Disp: 180 capsule, Rfl: 1    cetirizine (ZyrTEC) 10 mg tablet, Take 10 mg by mouth daily, Disp: , Rfl:     Cholecalciferol (VITAMIN D3) 5000 units CAPS, Take 1 capsule by mouth daily, Disp: , Rfl:     citalopram (CeleXA) 40 mg tablet, Take 40 mg by mouth every evening, Disp: , Rfl:     metFORMIN (GLUCOPHAGE) 500 mg tablet, Take 1 tablet (500 mg total) by mouth daily with breakfast, Disp: 90 tablet, Rfl: 1    omeprazole (PriLOSEC) 20 mg delayed release capsule, Take 1 capsule (20 mg total) by mouth daily, Disp: 90 capsule, Rfl: 1    Semaglutide,0 25 or 0 5MG/DOS, (Ozempic, 0 25 or 0 5 MG/DOSE,) 2 MG/1 5ML SOPN, Inject 0 25 mg under the skin once a week for 28 days, THEN 0 5 mg once a week , Disp: 1 5 mL, Rfl: 5    valACYclovir (VALTREX) 500 mg tablet, Take 500 mg by mouth daily as needed, Disp: , Rfl:     Levonorgest-Eth Estrad -Day 0 15-0 03 &0 01 MG TABS, Camrese 0 15 mg-30 mcg (84)/10 mcg(7) tablets,3 month dose pack  TAKE ONE TABLET BY MOUTH DAILY, Disp: , Rfl:   Allergies   Allergen Reactions    Lotrel [Amlodipine Besy-Benazepril Hcl] Headache     Migraine headaches (she can take Amlodipine with no side effects)    Percocet [Oxycodone-Acetaminophen] Itching     Category: Allergy;     Hydrocodone Itching    Other      Annotation - [id number]: raw fruit and vegetables    Oxycodone Itching    Pollen Extract     Tramadol Itching     Past Surgical History:   Procedure Laterality Date     SECTION      OVARIAN CYST SURGERY      WI LAP,TUBAL CAUTERY N/A 8/3/2017    Procedure: LIGATION/COAGULATION TUBAL LAPAROSCOPIC; LYSIS OF ADHESIONS;  Surgeon: Beverly Noble DO;  Location: [facility];  Service: Gynecology    WISDOM TOOTH EXTRACTION           Review of Systems   Constitutional: Negative  Negative for activity change, appetite change, chills, diaphoresis, fatigue, fever and unexpected weight change  HENT: Negative  Eyes: Negative  Respiratory: Negative  Cardiovascular: Negative  Gastrointestinal: Positive for abdominal distention and abdominal pain (Bloating)  Negative for anal bleeding, blood in stool, constipation, diarrhea, nausea, rectal pain and vomiting  Endocrine: Negative  Genitourinary: Negative  Musculoskeletal: Positive for arthralgias (Smaller joints)  Skin: Negative  Neurological: Negative  Hematological: Negative  Psychiatric/Behavioral: Negative    The patient is not nervous/anxious  Objective:      /74 (BP Location: Left arm, Patient Position: Sitting, Cuff Size: Large)   Pulse 94   Temp 99 2 °F (37 3 °C) (Tympanic)   Ht 5' 6" (1 676 m)   Wt 99 kg (218 lb 3 2 oz)   SpO2 99%   BMI 35 22 kg/m²          Physical Exam  Vitals reviewed  Constitutional:       General: She is not in acute distress  Appearance: She is obese  She is not ill-appearing, toxic-appearing or diaphoretic  HENT:      Head: Normocephalic and atraumatic  Right Ear: External ear normal       Left Ear: External ear normal       Nose: Nose normal       Mouth/Throat:      Mouth: Mucous membranes are moist    Eyes:      General: No scleral icterus  Conjunctiva/sclera: Conjunctivae normal       Pupils: Pupils are equal, round, and reactive to light  Cardiovascular:      Rate and Rhythm: Normal rate and regular rhythm  Pulses: Normal pulses  no weak pulses          Dorsalis pedis pulses are 2+ on the right side and 2+ on the left side  Posterior tibial pulses are 2+ on the right side and 2+ on the left side  Heart sounds: Normal heart sounds  No murmur heard  Pulmonary:      Effort: Pulmonary effort is normal  No respiratory distress  Breath sounds: Normal breath sounds  No wheezing or rales  Abdominal:      General: Abdomen is flat  There is no distension  Musculoskeletal:         General: No swelling  Cervical back: Neck supple  No rigidity or tenderness  Right lower leg: No edema  Left lower leg: No edema  Feet:      Right foot:      Skin integrity: No ulcer, skin breakdown, erythema, warmth, callus or dry skin  Left foot:      Skin integrity: No ulcer, skin breakdown, erythema, warmth, callus or dry skin  Skin:     General: Skin is warm  Capillary Refill: Capillary refill takes less than 2 seconds  Coloration: Skin is not jaundiced  Findings: No bruising, erythema or rash     Neurological:      General: No focal deficit present  Mental Status: She is alert and oriented to person, place, and time  Mental status is at baseline  Psychiatric:         Mood and Affect: Mood normal          Behavior: Behavior normal        Patient's shoes and socks removed  Right Foot/Ankle   Right Foot Inspection  Skin Exam: skin normal and skin intact  No dry skin, no warmth, no callus, no erythema, no maceration, no abnormal color, no pre-ulcer, no ulcer and no callus  Toe Exam: ROM and strength within normal limits  No swelling, no tenderness, erythema and  no right toe deformity    Sensory   Vibration: intact  Proprioception: intact  Monofilament testing: intact    Vascular  Capillary refills: < 3 seconds  The right DP pulse is 2+  The right PT pulse is 2+  Left Foot/Ankle  Left Foot Inspection  Skin Exam: skin normal and skin intact  No dry skin, no warmth, no erythema, no maceration, normal color, no pre-ulcer, no ulcer and no callus  Toe Exam: ROM and strength within normal limits  No swelling, no tenderness, no erythema and no left toe deformity  Sensory   Vibration: intact  Proprioception: intact  Monofilament testing: intact    Vascular  Capillary refills: < 3 seconds  The left DP pulse is 2+  The left PT pulse is 2+       Assign Risk Category  No deformity present  No loss of protective sensation  No weak pulses  Risk: 0

## 2022-08-18 ENCOUNTER — TELEPHONE (OUTPATIENT)
Dept: INTERNAL MEDICINE CLINIC | Facility: OTHER | Age: 42
End: 2022-08-18

## 2022-08-18 NOTE — TELEPHONE ENCOUNTER
LMOM for pt to call me at [facility] office    Calling to see if pt had a chance to schedule diabetic eye exam at [facility]. If not, please assist pt in scheduling.   If pt scheduled, pt get appt details

## 2022-11-07 NOTE — TELEPHONE ENCOUNTER
Pt actually forgot about her diabetic eye exam . She will make an appt at [facility]

## 2022-12-07 NOTE — TELEPHONE ENCOUNTER
Pt is overdue for follow up with Dr Dheeraj Rosas  Please call and schedule  Returned call to patient. Provided address. Reminded her to bring med bottles to visit.

## 2023-01-10 ENCOUNTER — TELEMEDICINE (OUTPATIENT)
Dept: INTERNAL MEDICINE CLINIC | Facility: OTHER | Age: 43
End: 2023-01-10

## 2023-01-10 VITALS
HEART RATE: 89 BPM | WEIGHT: 215 LBS | HEIGHT: 67 IN | SYSTOLIC BLOOD PRESSURE: 142 MMHG | DIASTOLIC BLOOD PRESSURE: 91 MMHG | BODY MASS INDEX: 33.74 KG/M2

## 2023-01-10 DIAGNOSIS — K30 DELAYED GASTRIC EMPTYING: Primary | ICD-10-CM

## 2023-01-10 DIAGNOSIS — E11.9 CONTROLLED TYPE 2 DIABETES MELLITUS WITHOUT COMPLICATION, WITHOUT LONG-TERM CURRENT USE OF INSULIN (HCC): ICD-10-CM

## 2023-01-10 RX ORDER — PANTOPRAZOLE SODIUM 40 MG/1
40 TABLET, DELAYED RELEASE ORAL
Qty: 90 TABLET | Refills: 1 | Status: SHIPPED | OUTPATIENT
Start: 2023-01-10

## 2023-01-10 RX ORDER — BLOOD SUGAR DIAGNOSTIC
STRIP MISCELLANEOUS
COMMUNITY
Start: 2022-10-17

## 2023-01-10 RX ORDER — SEMAGLUTIDE 1.34 MG/ML
INJECTION, SOLUTION SUBCUTANEOUS
COMMUNITY
Start: 2022-12-14

## 2023-01-10 NOTE — ASSESSMENT & PLAN NOTE
Will schedule a gastric emptying study with nuclear medicine, will continue probiotic, will switch omeprazole to Protonix

## 2023-01-10 NOTE — PROGRESS NOTES
Virtual Regular Visit    Verification of patient location:    Patient is located in the following state in which I hold an active license [place]      Assessment/Plan:    Problem List Items Addressed This Visit        Digestive    Delayed gastric emptying - Primary     Will schedule a gastric emptying study with nuclear medicine, will continue probiotic, will switch omeprazole to Protonix  Relevant Medications    pantoprazole (PROTONIX) 40 mg tablet       Endocrine    Diabetes mellitus type 2, controlled, without complications (HCC)    Relevant Medications    Ozempic, 0 25 or 0 5 MG/DOSE, 2 MG/1 5ML injection pen            Reason for visit is   Chief Complaint   Patient presents with   • Nausea   • Headache   • Heartburn     All symptoms have been going on for 2x weeks   •      Pt is due for DM exam - pt needs to find opthamologist to go to - insurance doesn't cover at [facility]    • Virtual Regular Visit        Encounter provider NAS Almazan    Provider located at [address] [place] [address] [address] [facility] [id number]      Recent Visits  No visits were found meeting these conditions  Showing recent visits within past 7 days and meeting all other requirements  Today's Visits  Date Type Provider Dept   01/10/23 [place], CRNP [facility]   Showing today's visits and meeting all other requirements  Future Appointments  No visits were found meeting these conditions  Showing future appointments within next 150 days and meeting all other requirements       The patient was identified by name and date of birth  Nuvia Mays was informed that this is a telemedicine visit and that the visit is being conducted through the [address] [place] [address] Road Now platform  She agrees to proceed     My office door was closed  No one else was in the room    She acknowledged consent and understanding of privacy and security of the video platform  The patient has agreed to participate and understands they can discontinue the visit at any time  Patient is aware this is a billable service  Subjective  Sudihr Daugherty is a 38 y o  female    Patient presents with nausea, bloating, and gas  Off and on diarrhea  She reports this has been an ongoing issue but has worsened over the past two weeks  She is currently taking omeprazole and feels her heart burn is controlled  She also takes OTC probitoic  She does have type two DM, she feels that she is well controlled  She denies hypo and hyperglycemia  Previously gastric empting study was ordered but not yet done  Past Medical History:   Diagnosis Date   • Abscess of apex of dental root complicating chronic inflammation 2018   • Acute bronchitis 2018   • Anxiety    • Depression 2016   • Diabetes mellitus ([facility] [address] )     NIDDM   • Flu-like symptoms 2018   • Herpes simplex infection    • Hypertension    • Obesity 2020   • Primary osteoarthritis involving multiple joints 2022   • Request for sterilization    • Right maxillary sinusitis 2018   • Scratch     left lower arm- healing- scabbed   • Seasonal allergies    • Strep throat 3/2/2018       Past Surgical History:   Procedure Laterality Date   •  SECTION     • OVARIAN CYST SURGERY     • AZ LAPAROSCOPY FULGURATION OVIDUCTS N/A 8/3/2017    Procedure: LIGATION/COAGULATION TUBAL LAPAROSCOPIC; LYSIS OF ADHESIONS;  Surgeon:  Beverly Noble DO;  Location: [facility] OR;  Service: Gynecology   • WISDOM TOOTH EXTRACTION         Current Outpatient Medications   Medication Sig Dispense Refill   • amLODIPine-valsartan (EXFORGE) 5-160 MG per tablet Take 1 tablet by mouth daily 90 tablet 1   • ascorbic acid (VITAMIN C) 500 mg tablet Take 1,000 mg by mouth daily       • Ashlyna 0 15-0 03 &0 01 MG TABS Take 1 tablet by mouth daily     • atorvastatin (LIPITOR) 10 mg tablet Take 1 tablet (10 mg total) by mouth daily 90 tablet 1   • celecoxib (CeleBREX) 100 mg capsule Take 1 capsule (100 mg total) by mouth 2 (two) times a day 180 capsule 1   • cetirizine (ZyrTEC) 10 mg tablet Take 10 mg by mouth daily     • Cholecalciferol (VITAMIN D3) 5000 units CAPS Take 1 capsule by mouth daily     • citalopram (CeleXA) 40 mg tablet Take 40 mg by mouth every evening     • [facility] COVID-19 Rapid Test KIT      • metFORMIN (GLUCOPHAGE) 500 mg tablet Take 1 tablet (500 mg total) by mouth daily with breakfast 90 tablet 1   • Ozempic, 0 25 or 0 5 MG/DOSE, 2 MG/1 5ML injection pen      • pantoprazole (PROTONIX) 40 mg tablet Take 1 tablet (40 mg total) by mouth daily before breakfast 90 tablet 1   • valACYclovir (VALTREX) 500 mg tablet Take 500 mg by mouth daily as needed     • Levonorgest-Eth Estrad 91-Day 0 15-0 03 &0 01 MG TABS Camrese 0 15 mg-30 mcg (84)/10 mcg(7) tablets,3 month dose pack   TAKE ONE TABLET BY MOUTH DAILY (Patient not taking: Reported on 1/10/2023)       No current facility-administered medications for this visit  Allergies   Allergen Reactions   • Lotrel [Amlodipine Besy-Benazepril Hcl] Headache     Migraine headaches (she can take Amlodipine with no side effects)   • Percocet [Oxycodone-Acetaminophen] Itching     Category: Allergy;    • Hydrocodone Itching   • Other      Annotation - [id number]: raw fruit and vegetables   • Oxycodone Itching   • Pollen Extract    • Tramadol Itching       Review of Systems   Constitutional: Negative for activity change, appetite change, chills, diaphoresis and fever  HENT: Negative for congestion, ear discharge, ear pain, postnasal drip, rhinorrhea, sinus pressure, sinus pain and sore throat  Eyes: Negative for pain, discharge, itching and visual disturbance  Respiratory: Negative for cough, chest tightness, shortness of breath and wheezing  Cardiovascular: Negative for chest pain, palpitations and leg swelling  Gastrointestinal: Positive for abdominal distention, diarrhea and nausea  Negative for abdominal pain, constipation and vomiting  Endocrine: Negative for polydipsia, polyphagia and polyuria  Genitourinary: Negative for difficulty urinating, dysuria and urgency  Musculoskeletal: Negative for arthralgias, back pain and neck pain  Skin: Negative for rash and wound  Neurological: Negative for dizziness, weakness, numbness and headaches  Video Exam    Vitals:    01/10/23 1018   BP: 142/91   BP Location: Left arm   Patient Position: Sitting   Cuff Size: Standard   Pulse: 89   Weight: 97 5 kg (215 lb)   Height: 5' 7" (1 702 m)       Physical Exam  Constitutional:       General: She is not in acute distress  HENT:      Mouth/Throat:      Pharynx: No oropharyngeal exudate  Eyes:      General: No scleral icterus  Pulmonary:      Effort: No respiratory distress  Neurological:      Mental Status: She is alert and oriented to person, place, and time  Psychiatric:         Behavior: Behavior normal          Thought Content:  Thought content normal          Judgment: Judgment normal           I spent 15 minutes directly with the patient during this visit

## 2023-01-13 ENCOUNTER — TELEPHONE (OUTPATIENT)
Dept: INTERNAL MEDICINE CLINIC | Facility: OTHER | Age: 43
End: 2023-01-13

## 2023-01-13 DIAGNOSIS — R92.8 ABNORMAL MAMMOGRAM: Primary | ICD-10-CM

## 2023-01-13 NOTE — TELEPHONE ENCOUNTER
Called patient [facility] asking for her to call the office back  Patient is going to be due shortly for her mammogram  Please find out if patient needs assistance with schedule or if she planned on making it herself       Thank you

## 2023-01-30 ENCOUNTER — OFFICE VISIT (OUTPATIENT)
Dept: INTERNAL MEDICINE CLINIC | Facility: CLINIC | Age: 43
End: 2023-01-30

## 2023-01-30 VITALS
HEART RATE: 90 BPM | WEIGHT: 222 LBS | SYSTOLIC BLOOD PRESSURE: 134 MMHG | HEIGHT: 67 IN | TEMPERATURE: 99.4 F | OXYGEN SATURATION: 98 % | DIASTOLIC BLOOD PRESSURE: 80 MMHG | BODY MASS INDEX: 34.84 KG/M2

## 2023-01-30 DIAGNOSIS — K30 DELAYED GASTRIC EMPTYING: ICD-10-CM

## 2023-01-30 DIAGNOSIS — I10 BENIGN ESSENTIAL HTN: ICD-10-CM

## 2023-01-30 DIAGNOSIS — G44.209 TENSION HEADACHE: ICD-10-CM

## 2023-01-30 DIAGNOSIS — E11.9 CONTROLLED TYPE 2 DIABETES MELLITUS WITHOUT COMPLICATION, WITHOUT LONG-TERM CURRENT USE OF INSULIN (HCC): Primary | ICD-10-CM

## 2023-01-30 DIAGNOSIS — M15.9 PRIMARY OSTEOARTHRITIS INVOLVING MULTIPLE JOINTS: ICD-10-CM

## 2023-01-30 RX ORDER — AMLODIPINE AND VALSARTAN 5; 160 MG/1; MG/1
1 TABLET ORAL DAILY
Qty: 90 TABLET | Refills: 1 | Status: SHIPPED | OUTPATIENT
Start: 2023-01-30 | End: 2023-07-29

## 2023-01-30 RX ORDER — ATORVASTATIN CALCIUM 10 MG/1
10 TABLET, FILM COATED ORAL DAILY
Qty: 90 TABLET | Refills: 1 | Status: SHIPPED | OUTPATIENT
Start: 2023-01-30

## 2023-01-30 RX ORDER — SEMAGLUTIDE 1.34 MG/ML
0.5 INJECTION, SOLUTION SUBCUTANEOUS
Qty: 1.5 ML | Refills: 5 | Status: CANCELLED | OUTPATIENT
Start: 2023-01-30

## 2023-01-30 RX ORDER — PANTOPRAZOLE SODIUM 40 MG/1
40 TABLET, DELAYED RELEASE ORAL
Qty: 90 TABLET | Refills: 1 | Status: SHIPPED | OUTPATIENT
Start: 2023-01-30

## 2023-01-30 RX ORDER — METHOCARBAMOL 750 MG/1
750 TABLET, FILM COATED ORAL EVERY 6 HOURS PRN
Qty: 60 TABLET | Refills: 2 | Status: SHIPPED | OUTPATIENT
Start: 2023-01-30

## 2023-01-30 RX ORDER — CELECOXIB 100 MG/1
100 CAPSULE ORAL 2 TIMES DAILY
Qty: 180 CAPSULE | Refills: 1 | Status: SHIPPED | OUTPATIENT
Start: 2023-01-30

## 2023-01-30 NOTE — PROGRESS NOTES
Name: Edgar Murillo      : 1980      MRN: [id number]  Encounter Provider: July Escobar MD  Encounter Date: 2023   Encounter department: [address] [place] [address]     1  Controlled type 2 diabetes mellitus without complication, without long-term current use of insulin ([facility] )  Assessment & Plan:  Patient has not had a hemoglobin A1c done in 6 months  She was given prescriptions to have labs drawn and will have them done as soon as possible  We will increase Ozempic to 1 mg weekly and then titrate up as tolerated up to 3 mg weekly  Lab Results   Component Value Date    HGBA1C 7 0 (H) 2022       Orders:  -     amLODIPine-valsartan (EXFORGE) 5-160 MG per tablet; Take 1 tablet by mouth daily  -     atorvastatin (LIPITOR) 10 mg tablet; Take 1 tablet (10 mg total) by mouth daily  -     metFORMIN (GLUCOPHAGE) 500 mg tablet; Take 1 tablet (500 mg total) by mouth daily with breakfast  -     Ambulatory Referral to Ophthalmology; Future  -     semaglutide, 1 mg/dose, (Ozempic) 4 mg/3 mL injection pen; Inject 0 75 mL (1 mg total) under the skin every 7 days  -     CBC; Future  -     Comprehensive metabolic panel; Future  -     Hemoglobin A1C; Future  -     Lipid panel; Future  -     Microalbumin / creatinine urine ratio    2  Benign essential HTN  Assessment & Plan:  Pressure appears to be nicely controlled on the current medication    Orders:  -     amLODIPine-valsartan (EXFORGE) 5-160 MG per tablet; Take 1 tablet by mouth daily  -     CBC; Future  -     Comprehensive metabolic panel; Future  -     Hemoglobin A1C; Future  -     Lipid panel; Future  -     Microalbumin / creatinine urine ratio    3  Delayed gastric emptying  Assessment & Plan:  Gastric emptying study has been scheduled    Orders:  -     pantoprazole (PROTONIX) 40 mg tablet;  Take 1 tablet (40 mg total) by mouth daily before breakfast  -     Hemoglobin A1C; Future  -     Lipid panel; Future    4  Primary osteoarthritis involving multiple joints  Assessment & Plan:  Continues with celecoxib pantoprazole  Orders:  -     celecoxib (CeleBREX) 100 mg capsule; Take 1 capsule (100 mg total) by mouth 2 (two) times a day    5  Tension headache  Assessment & Plan:  Provide the patient with a prescription for some Robaxin, 750 mg 1 tablet every 6 hours as needed feels with refills    Orders:  -     methocarbamol (Robaxin-750) 750 mg tablet; Take 1 tablet (750 mg total) by mouth every 6 (six) hours as needed for muscle spasms         Subjective      The patient presents for a follow-up visit for type 2 diabetes, degenerative osteoarthritis, delayed gastric emptying, hypertension  She has been complaining of a tension type headache past 2 days she has not experienced any nausea or vomiting  She has had no blood work since last July  She has had no adjustments to her dose of Ozempic    She reports that her dyspepsia is significantly improved with the change over to pantoprazole    Review of Systems    Current Outpatient Medications on File Prior to Visit   Medication Sig   • ascorbic acid (VITAMIN C) 500 mg tablet Take 1,000 mg by mouth daily     • Ashlyna 0 15-0 03 &0 01 MG TABS Take 1 tablet by mouth daily   • cetirizine (ZyrTEC) 10 mg tablet Take 10 mg by mouth daily   • Cholecalciferol (VITAMIN D3) 5000 units CAPS Take 1 capsule by mouth daily   • citalopram (CeleXA) 40 mg tablet Take 40 mg by mouth every evening   • Ozempic, 0 25 or 0 5 MG/DOSE, 2 MG/1 5ML injection pen Inject 0 5 mg under the skin every 7 days   • valACYclovir (VALTREX) 500 mg tablet Take 500 mg by mouth daily as needed   • [DISCONTINUED] amLODIPine-valsartan (EXFORGE) 5-160 MG per tablet Take 1 tablet by mouth daily   • [DISCONTINUED] atorvastatin (LIPITOR) 10 mg tablet Take 1 tablet (10 mg total) by mouth daily   • [DISCONTINUED] celecoxib (CeleBREX) 100 mg capsule Take 1 capsule (100 mg total) by mouth 2 (two) times a day   • [DISCONTINUED] metFORMIN (GLUCOPHAGE) 500 mg tablet Take 1 tablet (500 mg total) by mouth daily with breakfast   • [DISCONTINUED] pantoprazole (PROTONIX) 40 mg tablet Take 1 tablet (40 mg total) by mouth daily before breakfast   • Levonorgest-Eth Estrad 91-Day 0 15-0 03 &0 01 MG TABS    • [DISCONTINUED] eal COVID-19 Rapid Test KIT        Objective     /80 (BP Location: Left arm, Patient Position: Sitting, Cuff Size: Standard)   Pulse 90   Temp 99 4 °F (37 4 °C) (Tympanic)   Ht 5' 7 13" (1 705 m)   Wt 101 kg (222 lb)   SpO2 98%   BMI 34 64 kg/m²     Physical Exam  Rg Gonzalez MD

## 2023-01-31 NOTE — ASSESSMENT & PLAN NOTE
Provide the patient with a prescription for some Robaxin, 750 mg 1 tablet every 6 hours as needed feels with refills

## 2023-01-31 NOTE — ASSESSMENT & PLAN NOTE
Patient has not had a hemoglobin A1c done in 6 months  She was given prescriptions to have labs drawn and will have them done as soon as possible    We will increase Ozempic to 1 mg weekly and then titrate up as tolerated up to 3 mg weekly  Lab Results   Component Value Date    HGBA1C 7 0 (H) 07/27/2022

## 2023-04-08 LAB
CREAT ?TM UR-SCNC: 99.4 UMOL/L
EXT ALBUMIN URINE RANDOM: 3
HBA1C MFR BLD HPLC: 6.9 %
MICROALBUMIN/CREAT UR: 30.2 MG/G{CREAT}

## 2023-05-16 ENCOUNTER — TELEPHONE (OUTPATIENT)
Dept: INTERNAL MEDICINE CLINIC | Facility: CLINIC | Age: 43
End: 2023-05-16

## 2023-05-16 DIAGNOSIS — E11.9 CONTROLLED TYPE 2 DIABETES MELLITUS WITHOUT COMPLICATION, WITHOUT LONG-TERM CURRENT USE OF INSULIN (HCC): ICD-10-CM

## 2023-05-16 NOTE — TELEPHONE ENCOUNTER
rx refill for semaglutide, 1 mg/dose, (Ozempic) 4 mg/3 mL injection pen      Send to [facility] Nov- 4/66/35

## 2023-06-30 ENCOUNTER — OFFICE VISIT (OUTPATIENT)
Dept: INTERNAL MEDICINE CLINIC | Facility: CLINIC | Age: 43
End: 2023-06-30
Payer: COMMERCIAL

## 2023-06-30 VITALS
TEMPERATURE: 98.9 F | SYSTOLIC BLOOD PRESSURE: 126 MMHG | HEART RATE: 80 BPM | BODY MASS INDEX: 34.06 KG/M2 | DIASTOLIC BLOOD PRESSURE: 82 MMHG | WEIGHT: 217 LBS | OXYGEN SATURATION: 98 % | HEIGHT: 67 IN

## 2023-06-30 DIAGNOSIS — K30 DELAYED GASTRIC EMPTYING: ICD-10-CM

## 2023-06-30 DIAGNOSIS — E11.9 CONTROLLED TYPE 2 DIABETES MELLITUS WITHOUT COMPLICATION, WITHOUT LONG-TERM CURRENT USE OF INSULIN (HCC): ICD-10-CM

## 2023-06-30 DIAGNOSIS — M15.9 PRIMARY OSTEOARTHRITIS INVOLVING MULTIPLE JOINTS: ICD-10-CM

## 2023-06-30 DIAGNOSIS — I10 BENIGN ESSENTIAL HTN: ICD-10-CM

## 2023-06-30 DIAGNOSIS — G44.209 TENSION HEADACHE: ICD-10-CM

## 2023-06-30 DIAGNOSIS — E78.2 MIXED HYPERLIPIDEMIA: ICD-10-CM

## 2023-06-30 DIAGNOSIS — Z12.11 ENCOUNTER FOR SCREENING FOR MALIGNANT NEOPLASM OF COLON: Primary | ICD-10-CM

## 2023-06-30 RX ORDER — PANTOPRAZOLE SODIUM 40 MG/1
40 TABLET, DELAYED RELEASE ORAL
Qty: 90 TABLET | Refills: 1 | Status: SHIPPED | OUTPATIENT
Start: 2023-06-30

## 2023-06-30 RX ORDER — METHOCARBAMOL 750 MG/1
750 TABLET, FILM COATED ORAL EVERY 6 HOURS PRN
Qty: 60 TABLET | Refills: 5 | Status: SHIPPED | OUTPATIENT
Start: 2023-06-30

## 2023-06-30 RX ORDER — AMLODIPINE AND VALSARTAN 5; 160 MG/1; MG/1
1 TABLET ORAL DAILY
Qty: 90 TABLET | Refills: 1 | Status: SHIPPED | OUTPATIENT
Start: 2023-06-30 | End: 2023-12-27

## 2023-06-30 RX ORDER — ATORVASTATIN CALCIUM 10 MG/1
10 TABLET, FILM COATED ORAL DAILY
Qty: 90 TABLET | Refills: 1 | Status: SHIPPED | OUTPATIENT
Start: 2023-06-30

## 2023-06-30 RX ORDER — CELECOXIB 100 MG/1
100 CAPSULE ORAL 2 TIMES DAILY
Qty: 180 CAPSULE | Refills: 1 | Status: SHIPPED | OUTPATIENT
Start: 2023-06-30

## 2023-06-30 NOTE — PROGRESS NOTES
Name: Boone Najera      : 1980      MRN: [id number]  Encounter Provider: Kamilah Naidu MD  Encounter Date: 2023   Encounter department: [address] [place] [address]     1  Encounter for screening for malignant neoplasm of colon  -     Cologuard    2  Controlled type 2 diabetes mellitus without complication, without long-term current use of insulin ([facility] [address] )  Assessment & Plan:  Well most recent hemoglobin A1c 6 9%  Anticipate that this should improve further with the change in Ozempic dosing from 1 to 2 mg weekly  Lab Results   Component Value Date    HGBA1C 6 9 (H) 2023       Orders:  -     amLODIPine-valsartan (EXFORGE) 5-160 MG per tablet; Take 1 tablet by mouth daily  -     atorvastatin (LIPITOR) 10 mg tablet; Take 1 tablet (10 mg total) by mouth daily  -     metFORMIN (GLUCOPHAGE) 500 mg tablet; Take 1 tablet (500 mg total) by mouth daily with breakfast  -     semaglutide, 2 mg/dose, (Ozempic) 8 mg/ mL injection pen; Inject 0 75 mL (2 mg total) under the skin every 7 days  -     CBC; Future; Expected date: 2023  -     Comprehensive metabolic panel; Future; Expected date: 2023  -     Hemoglobin A1C; Future; Expected date: 2023  -     Albumin / creatinine urine ratio; Future; Expected date: 2023    3  Benign essential HTN  Assessment & Plan:  Blood pressure is nicely controlled on current medications  Orders:  -     amLODIPine-valsartan (EXFORGE) 5-160 MG per tablet; Take 1 tablet by mouth daily    4  Delayed gastric emptying  Assessment & Plan:  Patient was scheduled for gastric emptying study but developed an acute gastritis so the exam has been rescheduled    Orders:  -     pantoprazole (PROTONIX) 40 mg tablet; Take 1 tablet (40 mg total) by mouth daily before breakfast    5   Primary osteoarthritis involving multiple joints  Assessment & Plan:  Celebrex twice daily as needed    Orders:  -     celecoxib (CeleBREX) 100 mg capsule; Take 1 capsule (100 mg total) by mouth 2 (two) times a day    6  Tension headache  -     methocarbamol (Robaxin-750) 750 mg tablet; Take 1 tablet (750 mg total) by mouth every 6 (six) hours as needed for muscle spasms    7  Mixed hyperlipidemia  Assessment & Plan: Total cholesterol, HDL and LDL are all at goal levels  Continue atorvastatin 10 mg triglycerides are moderately elevated at 229 no additional treatment is recommended at this time  Subjective      Patient presents to the office for a 6-month follow-up visit  In general she is doing well  He has not had any significant issues with weekly dosing of Ozempic  She is currently at 1 mg/week and tolerating it well  Her hemoglobin A1c is 6 9% when checked in April  Blood pressure appears to be well controlled  Her lipids were reviewed  Total cholesterol is 180, HDL cholesterol 50 and LDL cholesterol was 84, all at goal levels  Triglycerides are mildly elevated at 229 but does not require any additional intervention at this time  He offers no particular complaints  She was recently treated by podiatry for an Achilles tendinitis as well as a planter fasciitis of the left foot  The symptoms are improved  Review of Systems   Constitutional: Negative  Negative for activity change, appetite change, chills, diaphoresis, fatigue, fever and unexpected weight change  HENT: Negative  Eyes: Negative  Respiratory: Negative  Cardiovascular: Negative  Gastrointestinal: Negative  Endocrine: Negative  Genitourinary: Negative  Musculoskeletal: Negative  Skin: Negative  Neurological: Negative  Hematological: Negative  Psychiatric/Behavioral: The patient is not nervous/anxious          Current Outpatient Medications on File Prior to Visit   Medication Sig   • ascorbic acid (VITAMIN C) 500 mg tablet Take 1,000 mg by mouth daily     • cetirizine (ZyrTEC) 10 mg tablet Take 10 mg by mouth daily   • "Cholecalciferol (VITAMIN D3) 5000 units CAPS Take 1 capsule by mouth daily   • citalopram (CeleXA) 40 mg tablet Take 40 mg by mouth every evening   • Levonorgest-Eth Estrad 91-Day 0 15-0 03 &0 01 MG TABS    • valACYclovir (VALTREX) 500 mg tablet Take 500 mg by mouth daily as needed   • [DISCONTINUED] amLODIPine-valsartan (EXFORGE) 5-160 MG per tablet Take 1 tablet by mouth daily   • [DISCONTINUED] atorvastatin (LIPITOR) 10 mg tablet Take 1 tablet (10 mg total) by mouth daily   • [DISCONTINUED] celecoxib (CeleBREX) 100 mg capsule Take 1 capsule (100 mg total) by mouth 2 (two) times a day   • [DISCONTINUED] metFORMIN (GLUCOPHAGE) 500 mg tablet Take 1 tablet (500 mg total) by mouth daily with breakfast   • [DISCONTINUED] methocarbamol (Robaxin-750) 750 mg tablet Take 1 tablet (750 mg total) by mouth every 6 (six) hours as needed for muscle spasms   • [DISCONTINUED] pantoprazole (PROTONIX) 40 mg tablet Take 1 tablet (40 mg total) by mouth daily before breakfast   • [DISCONTINUED] semaglutide, 1 mg/dose, (Ozempic) 4 mg/3 mL injection pen Inject 0 75 mL (1 mg total) under the skin every 7 days       Objective     /82 (BP Location: Left arm, Patient Position: Sitting, Cuff Size: Large)   Pulse 80   Temp 98 9 °F (37 2 °C) (Tympanic)   Ht 5' 7 13\" (1 705 m)   Wt 98 4 kg (217 lb)   SpO2 98%   BMI 33 86 kg/m²     Physical Exam  Vitals reviewed  Constitutional:       General: She is not in acute distress  Appearance: Normal appearance  She is normal weight  She is not ill-appearing, toxic-appearing or diaphoretic  HENT:      Head: Normocephalic and atraumatic  Right Ear: External ear normal       Left Ear: External ear normal       Nose: Nose normal    Eyes:      Conjunctiva/sclera: Conjunctivae normal       Pupils: Pupils are equal, round, and reactive to light  Cardiovascular:      Rate and Rhythm: Normal rate and regular rhythm  Pulses: Normal pulses   no weak pulses          Dorsalis pedis " pulses are 2+ on the right side and 2+ on the left side  Posterior tibial pulses are 2+ on the right side and 2+ on the left side  Heart sounds: Normal heart sounds  No murmur heard  Pulmonary:      Effort: Pulmonary effort is normal  No respiratory distress  Breath sounds: Normal breath sounds  No wheezing or rales  Abdominal:      General: Abdomen is flat  There is no distension  Musculoskeletal:         General: No swelling  Cervical back: Neck supple  No rigidity  Right lower leg: No edema  Left lower leg: No edema  Feet:      Right foot:      Skin integrity: No ulcer, skin breakdown, erythema, warmth, callus or dry skin  Left foot:      Skin integrity: No ulcer, skin breakdown, erythema, warmth, callus or dry skin  Skin:     General: Skin is warm  Capillary Refill: Capillary refill takes less than 2 seconds  Coloration: Skin is not jaundiced  Findings: No bruising, erythema or rash  Neurological:      General: No focal deficit present  Mental Status: She is alert and oriented to person, place, and time  Mental status is at baseline  Psychiatric:         Mood and Affect: Mood normal          Behavior: Behavior normal      Patient's shoes and socks removed  Right Foot/Ankle   Right Foot Inspection  Skin Exam: skin normal and skin intact  No dry skin, no warmth, no callus, no erythema, no maceration, no abnormal color, no pre-ulcer, no ulcer and no callus  Toe Exam: ROM and strength within normal limits  No swelling, no tenderness, erythema and  no right toe deformity    Sensory   Vibration: intact  Proprioception: intact  Monofilament testing: intact    Vascular  Capillary refills: < 3 seconds  The right DP pulse is 2+  The right PT pulse is 2+  Left Foot/Ankle  Left Foot Inspection  Skin Exam: skin normal and skin intact  No dry skin, no warmth, no erythema, no maceration, normal color, no pre-ulcer, no ulcer and no callus       Toe Exam: ROM and strength within normal limits  No swelling, no tenderness, no erythema and no left toe deformity  Sensory   Vibration: intact  Proprioception: intact  Monofilament testing: intact    Vascular  Capillary refills: < 3 seconds  The left DP pulse is 2+  The left PT pulse is 2+       Assign Risk Category  No deformity present  No loss of protective sensation  No weak pulses  Risk: 0    Brayan Saab MD

## 2023-06-30 NOTE — ASSESSMENT & PLAN NOTE
Well most recent hemoglobin A1c 6 9%    Anticipate that this should improve further with the change in Ozempic dosing from 1 to 2 mg weekly  Lab Results   Component Value Date    HGBA1C 6 9 (H) 04/08/2023

## 2023-06-30 NOTE — ASSESSMENT & PLAN NOTE
Patient was scheduled for gastric emptying study but developed an acute gastritis so the exam has been rescheduled

## 2023-06-30 NOTE — ASSESSMENT & PLAN NOTE
Total cholesterol, HDL and LDL are all at goal levels  Continue atorvastatin 10 mg triglycerides are moderately elevated at 229 no additional treatment is recommended at this time

## 2023-07-03 ENCOUNTER — TELEPHONE (OUTPATIENT)
Dept: ADMINISTRATIVE | Facility: OTHER | Age: 43
End: 2023-07-03

## 2023-07-03 NOTE — TELEPHONE ENCOUNTER
----- Message from Pearl Nguyen sent at 6/30/2023  6:34 PM EDT -----  Regarding: mammogram - [facility]  06/30/23 6:34 PM    Hello, our patient Jamal Barclay has had Mammogram completed/performed. Please assist in updating the patient chart by pulling the Care Everywhere (CE) document. The date of service is 03/17/2023.      Thank you,  Pearl Nguyen  [facility] [address] [facility]

## 2023-07-05 NOTE — TELEPHONE ENCOUNTER
Upon review of the In Basket request we were able to locate, review, and update the patient chart as requested for Mammogram.    Any additional questions or concerns should be emailed to the Practice Liaisons via the appropriate education email address, please do not reply via In Basket.     Thank you  Toan Swan

## 2023-07-12 ENCOUNTER — TELEPHONE (OUTPATIENT)
Dept: INTERNAL MEDICINE CLINIC | Facility: OTHER | Age: 43
End: 2023-07-12

## 2023-07-12 NOTE — TELEPHONE ENCOUNTER
Spoke to patient. She did not yet schedule eye. Pt will schedule. She now has to make a separate eye appt for the diabetic portion of the eye appt.

## 2023-09-24 ENCOUNTER — AMB VIDEO VISIT (OUTPATIENT)
Dept: OTHER | Facility: HOSPITAL | Age: 43
End: 2023-09-24

## 2023-09-24 DIAGNOSIS — J01.10 ACUTE NON-RECURRENT FRONTAL SINUSITIS: Primary | ICD-10-CM

## 2023-09-24 DIAGNOSIS — B37.31 RECURRENT CANDIDIASIS OF VAGINA: ICD-10-CM

## 2023-09-24 PROBLEM — Z30.2 REQUEST FOR STERILIZATION: Status: RESOLVED | Noted: 2017-08-03 | Resolved: 2023-09-24

## 2023-09-24 PROCEDURE — ECARE PR SL URGENT CARE VIRTUAL VISIT: Performed by: PHYSICIAN ASSISTANT

## 2023-09-24 RX ORDER — FLUCONAZOLE 150 MG/1
150 TABLET ORAL ONCE
Qty: 1 TABLET | Refills: 0 | Status: SHIPPED | OUTPATIENT
Start: 2023-09-24 | End: 2023-09-24

## 2023-09-24 RX ORDER — AMOXICILLIN AND CLAVULANATE POTASSIUM 875; 125 MG/1; MG/1
1 TABLET, FILM COATED ORAL 2 TIMES DAILY
Qty: 20 TABLET | Refills: 0 | Status: SHIPPED | OUTPATIENT
Start: 2023-09-24 | End: 2023-10-04

## 2023-09-24 NOTE — PATIENT INSTRUCTIONS
As discussed, sinus infections are typically viral and will get better on their own in 7-10 days. You should try symptomatic relief in the meantime with OTC (Claritin or Zyrtec), Afrin up to 3 days then switch to Flonase, and Sudafed. You can also try sinus rinses with a neti pot or nasal lavage (be sure to use distilled water.) If your symptoms do not improve after 7-10 days, you may need antibiotics because it is more likely to be bacterial at that point. Follow-up with your primary care physician for recheck in 2-3 business days especially if fever not improving. Go to the ER for sudden severe headache, high fevers, change in vision, seizure activity or anything else that is concerning.

## 2023-09-24 NOTE — CARE ANYWHERE EVISITS
Visit Summary for Cheo Nix - Gender: Female - Date of Birth: [id number]  Date: [id number] - Duration: 5 minutes  Patient: Yadi Chavis Helen Nix  Provider: Natalia Burt PA-C    Patient Contact Information  Address  [place]; [address] [facility]  [phone]    Visit Topics  Fever [Added By: Self - 2023-09-24]  Headache [Added By: Self - 2023-09-24]  cough congestion  [Added By: Self - 2023-09-24]  Flu-Like Symptoms [Added By: Self - 2023-09-24]    Triage Questions   What is your current physical address in the event of a medical emergency? Answer []  Are you allergic to any medications? Answer []  Are you now or could you be pregnant? Answer []  Do you have any immune system compromise or chronic lung   disease? Answer []  Do you have any vulnerable family members in the home (infant, pregnant, cancer, elderly)? Answer []     Conversation Transcripts  [0A][0A] [Notification] You are connected with Natalia Burt PA-C, Urgent Care Specialist.[0A][Notification] Jake Steinberg is located in [place]. [0A][Notification] Jake Steinberg has shared health history. Jim Reyes .[0A]    Diagnosis  Acute frontal sinusitis    Procedures  Value: [id number] Code: CPT-4 UNLISTED E&M SERVICE    Medications Prescribed    No prescriptions ordered    Electronically signed by: Marian Fishman(NPI [id number])

## 2023-09-24 NOTE — PROGRESS NOTES
Video Visit - Amalia Buitrago 37 y.o. female MRN: [id number]    REQUIRED DOCUMENTATION:         1. This service was provided via [employer]. 2. Provider located at [address] [place] [id number] [phone].  3. [facility] provider: Carolina Mckeon PA-C.  4. Identify all parties in room with patient during [facility] visit:  No one else  5. After connecting through [employer], patient was identified by name and date of birth. Patient was then informed that this was a Telemedicine visit and that the exam was being conducted confidentially over secure lines. My office door was closed. No one else was in the room. Patient acknowledged consent and understanding of privacy and security of the Telemedicine visit. I informed the patient that I have reviewed their record in Epic and presented the opportunity for them to ask any questions regarding the visit today. The patient agreed to participate. VITALS: Heart Rate: 96 BPM, Respiratory Rate: 16 RPM, Temperature 96.8° F, Blood Pressure 131/85 mmHg, Pulse Ox 99 % on RA    HPI  Pt reports 9 days ago having subjective fevers/chills and URI sx. For the past 4 days worsening congestion, coughing clear sputum, frontal and maxillary sinus pressure, sore throat. Taking dayquil without relief. COVID negative. Gets yeast infections when on augmentin  Physical Exam  Constitutional:       General: She is not in acute distress. Appearance: Normal appearance. She is not ill-appearing or toxic-appearing. Comments: pleasant   HENT:      Head: Normocephalic and atraumatic. Nose: No rhinorrhea. Mouth/Throat:      Mouth: Mucous membranes are moist.   Eyes:      Conjunctiva/sclera: Conjunctivae normal.      Comments: glasses   Cardiovascular:      Rate and Rhythm: Normal rate. Pulmonary:      Effort: Pulmonary effort is normal. No respiratory distress. Breath sounds:  No wheezing (no gross audible wheeze through computer). Musculoskeletal:      Cervical back: Normal range of motion. Skin:     Findings: No rash (on face or neck). Neurological:      Mental Status: She is alert. Cranial Nerves: No dysarthria or facial asymmetry. Psychiatric:         Mood and Affect: Mood normal.         Behavior: Behavior normal.         Diagnoses and all orders for this visit:    Acute non-recurrent frontal sinusitis  -     amoxicillin-clavulanate (AUGMENTIN) 875-125 mg per tablet; Take 1 tablet by mouth 2 (two) times a day for 10 days    Recurrent candidiasis of vagina  -     fluconazole (DIFLUCAN) 150 mg tablet; Take 1 tablet (150 mg total) by mouth once for 1 dose      Patient Instructions   As discussed, sinus infections are typically viral and will get better on their own in 7-10 days. You should try symptomatic relief in the meantime with OTC (Claritin or Zyrtec), Afrin up to 3 days then switch to Flonase, and Sudafed. You can also try sinus rinses with a neti pot or nasal lavage (be sure to use distilled water.) If your symptoms do not improve after 7-10 days, you may need antibiotics because it is more likely to be bacterial at that point. Follow-up with your primary care physician for recheck in 2-3 business days especially if fever not improving. Go to the ER for sudden severe headache, high fevers, change in vision, seizure activity or anything else that is concerning.

## 2023-11-16 ENCOUNTER — VBI (OUTPATIENT)
Dept: ADMINISTRATIVE | Facility: OTHER | Age: 43
End: 2023-11-16

## 2023-12-09 LAB
CREAT ?TM UR-SCNC: 126.9 UMOL/L
EXT ALBUMIN URINE RANDOM: 5.6
HBA1C MFR BLD HPLC: 7 %
MICROALBUMIN/CREAT UR: 44.1 MG/G{CREAT}

## 2024-01-11 ENCOUNTER — OFFICE VISIT (OUTPATIENT)
Age: 44
End: 2024-01-11
Payer: COMMERCIAL

## 2024-01-11 VITALS
SYSTOLIC BLOOD PRESSURE: 124 MMHG | BODY MASS INDEX: 34.21 KG/M2 | TEMPERATURE: 98.6 F | HEIGHT: 67 IN | HEART RATE: 82 BPM | OXYGEN SATURATION: 97 % | WEIGHT: 218 LBS | DIASTOLIC BLOOD PRESSURE: 72 MMHG

## 2024-01-11 DIAGNOSIS — G44.209 TENSION HEADACHE: ICD-10-CM

## 2024-01-11 DIAGNOSIS — M15.9 PRIMARY OSTEOARTHRITIS INVOLVING MULTIPLE JOINTS: ICD-10-CM

## 2024-01-11 DIAGNOSIS — K30 DELAYED GASTRIC EMPTYING: ICD-10-CM

## 2024-01-11 DIAGNOSIS — I10 BENIGN ESSENTIAL HTN: ICD-10-CM

## 2024-01-11 DIAGNOSIS — Z12.31 ENCOUNTER FOR SCREENING MAMMOGRAM FOR MALIGNANT NEOPLASM OF BREAST: Primary | ICD-10-CM

## 2024-01-11 DIAGNOSIS — E11.9 CONTROLLED TYPE 2 DIABETES MELLITUS WITHOUT COMPLICATION, WITHOUT LONG-TERM CURRENT USE OF INSULIN (HCC): ICD-10-CM

## 2024-01-11 PROCEDURE — 99214 OFFICE O/P EST MOD 30 MIN: CPT | Performed by: INTERNAL MEDICINE

## 2024-01-11 RX ORDER — CELECOXIB 100 MG/1
100 CAPSULE ORAL 2 TIMES DAILY
Qty: 180 CAPSULE | Refills: 1 | Status: SHIPPED | OUTPATIENT
Start: 2024-01-11

## 2024-01-11 RX ORDER — ATORVASTATIN CALCIUM 10 MG/1
10 TABLET, FILM COATED ORAL DAILY
Qty: 90 TABLET | Refills: 1 | Status: SHIPPED | OUTPATIENT
Start: 2024-01-11

## 2024-01-11 RX ORDER — METHOCARBAMOL 750 MG/1
750 TABLET, FILM COATED ORAL EVERY 6 HOURS PRN
Qty: 60 TABLET | Refills: 5 | Status: SHIPPED | OUTPATIENT
Start: 2024-01-11

## 2024-01-11 RX ORDER — PANTOPRAZOLE SODIUM 40 MG/1
40 TABLET, DELAYED RELEASE ORAL
Qty: 90 TABLET | Refills: 1 | Status: SHIPPED | OUTPATIENT
Start: 2024-01-11

## 2024-01-11 RX ORDER — AMLODIPINE AND VALSARTAN 5; 160 MG/1; MG/1
1 TABLET ORAL DAILY
Qty: 90 TABLET | Refills: 1 | Status: SHIPPED | OUTPATIENT
Start: 2024-01-11 | End: 2024-07-09

## 2024-01-12 NOTE — ASSESSMENT & PLAN NOTE
A gastric emptying study was scheduled but the patient developed an acute gastritis with vomiting on the day of the test.  Will need to be rescheduled

## 2024-01-12 NOTE — ASSESSMENT & PLAN NOTE
Recent A1c 7.0 on a combination of semaglutide 2 mg weekly, metformin 500 mg daily with breakfast.  Lab Results   Component Value Date    HGBA1C 7.0 (H) 12/09/2023

## 2024-01-12 NOTE — PROGRESS NOTES
Name: Yadi Gillette      : 1980      MRN: [id number]  Encounter Provider: Gigi Quesada MD  Encounter Date: 2024   Encounter department: [facility] [place]    Assessment & Plan     1. Encounter for screening mammogram for malignant neoplasm of breast  -     Mammo screening bilateral w 3d & cad; Future; Expected date: 2024    2. Controlled type 2 diabetes mellitus without complication, without long-term current use of insulin (HCC)  Assessment & Plan:  Recent A1c 7.0 on a combination of semaglutide 2 mg weekly, metformin 500 mg daily with breakfast.  Lab Results   Component Value Date    HGBA1C 7.0 (H) 2023       Orders:  -     amLODIPine-valsartan (EXFORGE) 5-160 MG per tablet; Take 1 tablet by mouth daily  -     atorvastatin (LIPITOR) 10 mg tablet; Take 1 tablet (10 mg total) by mouth daily  -     metFORMIN (GLUCOPHAGE) 500 mg tablet; Take 1 tablet (500 mg total) by mouth daily with breakfast  -     semaglutide, 2 mg/dose, (Ozempic) 8 mg/ mL injection pen; Inject 0.75 mL (2 mg total) under the skin every 7 days  -     CBC; Future; Expected date: 2024  -     Comprehensive metabolic panel; Future; Expected date: 2024  -     Hemoglobin A1C; Future; Expected date: 2024  -     Lipid panel; Future; Expected date: 2024  -     Albumin / creatinine urine ratio; Future; Expected date: 2024    3. Benign essential HTN  Assessment & Plan:  Blood pressure is nicely controlled on Exforge 5/160    Orders:  -     amLODIPine-valsartan (EXFORGE) 5-160 MG per tablet; Take 1 tablet by mouth daily    4. Delayed gastric emptying  Assessment & Plan:  A gastric emptying study was scheduled but the patient developed an acute gastritis with vomiting on the day of the test.  Will need to be rescheduled    Orders:  -     pantoprazole (PROTONIX) 40 mg tablet; Take 1 tablet (40 mg total) by mouth daily before breakfast    5. Primary osteoarthritis  involving multiple joints  Assessment & Plan:  Continues with Celebrex and methocarbamol as needed    Orders:  -     celecoxib (CeleBREX) 100 mg capsule; Take 1 capsule (100 mg total) by mouth 2 (two) times a day    6. Tension headache  Assessment & Plan:  Celebrex and methocarbamol as needed    Orders:  -     methocarbamol (Robaxin-750) 750 mg tablet; Take 1 tablet (750 mg total) by mouth every 6 (six) hours as needed for muscle spasms           Subjective      The patient presents for a 6-month follow-up visit.  She had labs drawn in early December which were reviewed.  Hemoglobin A1c is borderline at 7%.  Currently on 500 mg of metformin and 2 mg of semaglutide daily.  She is just recovering from an upper respiratory illness which has lasted approximately 3 weeks.  She has no major complaints at this time.      Review of Systems   Constitutional: Negative.  Negative for chills, fatigue and fever.   HENT: Negative.     Eyes: Negative.    Respiratory:  Positive for cough. Negative for chest tightness, shortness of breath, wheezing and stridor.    Cardiovascular: Negative.    Gastrointestinal: Negative.    Endocrine: Negative.    Genitourinary: Negative.    Musculoskeletal: Negative.    Skin: Negative.    Allergic/Immunologic: Negative.    Neurological: Negative.    Hematological: Negative.    Psychiatric/Behavioral: Negative.         Current Outpatient Medications on File Prior to Visit   Medication Sig    ascorbic acid (VITAMIN C) 500 mg tablet Take 1,000 mg by mouth daily      cetirizine (ZyrTEC) 10 mg tablet Take 10 mg by mouth daily    Cholecalciferol (VITAMIN D3) 5000 units CAPS Take 1 capsule by mouth daily    citalopram (CeleXA) 40 mg tablet Take 40 mg by mouth every evening    Levonorgest-Eth Estrad 91-Day 0.15-0.03 &0.01 MG TABS     valACYclovir (VALTREX) 500 mg tablet Take 500 mg by mouth daily as needed    [DISCONTINUED] amLODIPine-valsartan (EXFORGE) 5-160 MG per tablet Take 1 tablet by mouth daily     "[DISCONTINUED] atorvastatin (LIPITOR) 10 mg tablet Take 1 tablet (10 mg total) by mouth daily    [DISCONTINUED] celecoxib (CeleBREX) 100 mg capsule Take 1 capsule (100 mg total) by mouth 2 (two) times a day    [DISCONTINUED] metFORMIN (GLUCOPHAGE) 500 mg tablet Take 1 tablet (500 mg total) by mouth daily with breakfast    [DISCONTINUED] methocarbamol (Robaxin-750) 750 mg tablet Take 1 tablet (750 mg total) by mouth every 6 (six) hours as needed for muscle spasms    [DISCONTINUED] pantoprazole (PROTONIX) 40 mg tablet Take 1 tablet (40 mg total) by mouth daily before breakfast    [DISCONTINUED] semaglutide, 2 mg/dose, (Ozempic) 8 mg/ mL injection pen Inject 0.75 mL (2 mg total) under the skin every 7 days       Objective     /72 (BP Location: Left arm, Patient Position: Sitting, Cuff Size: Standard)   Pulse 82   Temp 98.6 °F (37 °C) (Temporal)   Ht 5' 6.54\" (1.69 m)   Wt 98.9 kg (218 lb)   SpO2 97%   BMI 34.62 kg/m²     Physical Exam  Vitals reviewed.   Constitutional:       General: She is not in acute distress.     Appearance: Normal appearance. She is obese. She is not ill-appearing, toxic-appearing or diaphoretic.   HENT:      Head: Normocephalic and atraumatic.      Right Ear: External ear normal.      Left Ear: External ear normal.      Nose: Nose normal.      Mouth/Throat:      Mouth: Mucous membranes are moist.   Eyes:      General: No scleral icterus.     Conjunctiva/sclera: Conjunctivae normal.      Pupils: Pupils are equal, round, and reactive to light.   Cardiovascular:      Rate and Rhythm: Normal rate and regular rhythm.      Heart sounds: Normal heart sounds.   Pulmonary:      Effort: Pulmonary effort is normal. No respiratory distress.      Breath sounds: Normal breath sounds.   Abdominal:      General: Abdomen is flat. There is no distension.   Musculoskeletal:      Cervical back: Neck supple. No rigidity.      Right lower leg: No edema.      Left lower leg: No edema.   Skin:     General: " Skin is warm.      Coloration: Skin is not jaundiced.      Findings: No bruising, erythema or rash.   Neurological:      General: No focal deficit present.      Mental Status: She is alert and oriented to person, place, and time. Mental status is at baseline.   Psychiatric:         Mood and Affect: Mood normal.         Behavior: Behavior normal.       Gigi Quesada MD

## 2024-03-22 ENCOUNTER — HOSPITAL ENCOUNTER (OUTPATIENT)
Dept: RADIOLOGY | Facility: IMAGING CENTER | Age: 44
End: 2024-03-22
Payer: COMMERCIAL

## 2024-03-22 VITALS — BODY MASS INDEX: 32.28 KG/M2 | HEIGHT: 68 IN | WEIGHT: 213 LBS

## 2024-03-22 DIAGNOSIS — Z12.31 ENCOUNTER FOR SCREENING MAMMOGRAM FOR MALIGNANT NEOPLASM OF BREAST: ICD-10-CM

## 2024-03-22 PROCEDURE — 77063 BREAST TOMOSYNTHESIS BI: CPT

## 2024-03-22 PROCEDURE — 77067 SCR MAMMO BI INCL CAD: CPT

## 2024-04-23 ENCOUNTER — TELEPHONE (OUTPATIENT)
Age: 44
End: 2024-04-23

## 2024-04-23 NOTE — TELEPHONE ENCOUNTER
Patient stopped by the office to have Dr. Quesada fill out forms for her for a new job she is starting with [employer].  She has conditions with Dupuytren's contracture of both hands, chronic left knee pain, and also left achillis tendonitis.  She would like to have it faxed to the number on the form when completed.    She was told Dr. Quesada is not in the office for two weeks.  Will try to see if anyone else will fill it out.  Did state that she may have to be seen for us to fill out the forms for her since know one else has seen her for this issue.    Please call her back at the mobile phone     Will scan into the chart

## 2024-04-24 ENCOUNTER — OFFICE VISIT (OUTPATIENT)
Age: 44
End: 2024-04-24
Payer: COMMERCIAL

## 2024-04-24 VITALS
HEIGHT: 67 IN | HEART RATE: 76 BPM | OXYGEN SATURATION: 96 % | TEMPERATURE: 98.4 F | WEIGHT: 215 LBS | SYSTOLIC BLOOD PRESSURE: 112 MMHG | DIASTOLIC BLOOD PRESSURE: 64 MMHG | BODY MASS INDEX: 33.74 KG/M2

## 2024-04-24 DIAGNOSIS — Z00.00 ANNUAL PHYSICAL EXAM: Primary | ICD-10-CM

## 2024-04-24 DIAGNOSIS — E11.9 CONTROLLED TYPE 2 DIABETES MELLITUS WITHOUT COMPLICATION, WITHOUT LONG-TERM CURRENT USE OF INSULIN (HCC): ICD-10-CM

## 2024-04-24 PROCEDURE — 99396 PREV VISIT EST AGE 40-64: CPT | Performed by: FAMILY MEDICINE

## 2024-04-24 NOTE — PROGRESS NOTES
Assessment/Plan:    1. Annual physical exam    2. Controlled type 2 diabetes mellitus without complication, without long-term current use of insulin ([facility])  -     Ambulatory Referral to Ophthalmology; Future    BMI counseling: the BMI is above normal. Nutrition recommendations include reducing portion sizes and increasing intake of lean protein. Exercise recommendations include moderate aerobic physical activity for 150 minutes/week.        There are no Patient Instructions on file for this visit.    Return for Next scheduled follow up.    Subjective:      Patient ID: Yadi Gillette is a 43 y.o. female.    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  She needs evaluation due to her history of Dupuytren's Contractures bilateral hands, left knee arthritis and left achilles tendinitis.       HPI      Adult Annual Physical   Patient here for a comprehensive physical exam. The patient reports no problems.    Diet and Physical Activity  Diet/Nutrition: portion control. On ozempic  Exercise: no formal exercise.      Depression Screening  PHQ-9 Depression Screening    PHQ-9:    Frequency of the following problems over the past two weeks:            General Health  Sleep: sleeps well.   Hearing: normal - bilateral.  Vision: no vision problems.   Dental: regular dental visits.       /GYN Health  Patient is:  last cycle was feb 1, 2024, follows with GYN, hx of OCP use   Last menstrual period: feb 1, 2024  Contraceptive method: tubal ligation  Mammo 3/ 2024- normal  Cologuard 6/ 2023  PAP:normal UTD      The following portions of the patient's history were reviewed and updated as appropriate: allergies, current medications, past family history, past medical history, past social history, past surgical history and problem list.    Review of Systems      Current Outpatient Medications   Medication Sig  "Dispense Refill    amLODIPine-valsartan (EXFORGE) 5-160 MG per tablet Take 1 tablet by mouth daily 90 tablet 1    ascorbic acid (VITAMIN C) 500 mg tablet Take 1,000 mg by mouth daily        atorvastatin (LIPITOR) 10 mg tablet Take 1 tablet (10 mg total) by mouth daily 90 tablet 1    celecoxib (CeleBREX) 100 mg capsule Take 1 capsule (100 mg total) by mouth 2 (two) times a day 180 capsule 1    cetirizine (ZyrTEC) 10 mg tablet Take 10 mg by mouth daily      Cholecalciferol (VITAMIN D3) 5000 units CAPS Take 1 capsule by mouth daily      citalopram (CeleXA) 40 mg tablet Take 40 mg by mouth every evening      metFORMIN (GLUCOPHAGE) 500 mg tablet Take 1 tablet (500 mg total) by mouth daily with breakfast 90 tablet 1    methocarbamol (Robaxin-750) 750 mg tablet Take 1 tablet (750 mg total) by mouth every 6 (six) hours as needed for muscle spasms 60 tablet 5    pantoprazole (PROTONIX) 40 mg tablet Take 1 tablet (40 mg total) by mouth daily before breakfast 90 tablet 1    semaglutide, 2 mg/dose, (Ozempic) 8 mg/ mL injection pen Inject 0.75 mL (2 mg total) under the skin every 7 days 9 mL 1    valACYclovir (VALTREX) 500 mg tablet Take 500 mg by mouth daily as needed       No current facility-administered medications for this visit.       Objective:    /64 (BP Location: Left arm, Patient Position: Sitting, Cuff Size: Standard)   Pulse 76   Temp 98.4 °F (36.9 °C) (Temporal)   Ht 5' 6.73\" (1.695 m)   Wt 97.5 kg (215 lb)   SpO2 96%   BMI 33.94 kg/m²        Physical Exam           Duckworth Jimmy, DO  "

## 2024-06-20 NOTE — TELEPHONE ENCOUNTER
[facility] - Cath Lab  Discharge Note  Short Stay    Procedure(s) (LRB):  ANGIOGRAM, CORONARY, INCLUDING BYPASS GRAFT, WITH LEFT HEART CATHETERIZATION (N/A)  AORTOGRAM (N/A)      OUTCOME: Patient tolerated treatment/procedure well without complication and is now ready for discharge.    DISPOSITION: Home or Self Care    FINAL DIAGNOSIS:  <principal problem not specified>    FOLLOWUP: In clinic    DISCHARGE INSTRUCTIONS:    Discharge Procedure Orders   Diet general         Clinical Reference Documents Added to Patient Instructions         Document    CARDIAC CATHETERIZATION DISCHARGE INSTRUCTIONS (ENGLISH)            TIME SPENT ON DISCHARGE: 35 minutes   Patient was instructed to stop azithromycin and start amoxicillin

## 2024-07-11 ENCOUNTER — OFFICE VISIT (OUTPATIENT)
Age: 44
End: 2024-07-11
Payer: COMMERCIAL

## 2024-07-11 VITALS
BODY MASS INDEX: 34.53 KG/M2 | WEIGHT: 220 LBS | TEMPERATURE: 97.4 F | HEIGHT: 67 IN | OXYGEN SATURATION: 98 % | HEART RATE: 80 BPM | DIASTOLIC BLOOD PRESSURE: 80 MMHG | SYSTOLIC BLOOD PRESSURE: 122 MMHG

## 2024-07-11 DIAGNOSIS — K30 DELAYED GASTRIC EMPTYING: ICD-10-CM

## 2024-07-11 DIAGNOSIS — E11.9 CONTROLLED TYPE 2 DIABETES MELLITUS WITHOUT COMPLICATION, WITHOUT LONG-TERM CURRENT USE OF INSULIN (HCC): ICD-10-CM

## 2024-07-11 DIAGNOSIS — G44.209 TENSION HEADACHE: ICD-10-CM

## 2024-07-11 DIAGNOSIS — Z12.11 ENCOUNTER FOR SCREENING FOR MALIGNANT NEOPLASM OF COLON: Primary | ICD-10-CM

## 2024-07-11 DIAGNOSIS — M15.9 PRIMARY OSTEOARTHRITIS INVOLVING MULTIPLE JOINTS: ICD-10-CM

## 2024-07-11 DIAGNOSIS — I10 BENIGN ESSENTIAL HTN: ICD-10-CM

## 2024-07-11 DIAGNOSIS — F32.89 OTHER DEPRESSION: ICD-10-CM

## 2024-07-11 LAB
CREAT UR-MCNC: 20.6 MG/DL
MICROALBUMIN UR-MCNC: <7 MG/L
SL AMB POCT HEMOGLOBIN AIC: 6.7 (ref ?–6.5)

## 2024-07-11 PROCEDURE — 82043 UR ALBUMIN QUANTITATIVE: CPT | Performed by: INTERNAL MEDICINE

## 2024-07-11 PROCEDURE — 83036 HEMOGLOBIN GLYCOSYLATED A1C: CPT | Performed by: INTERNAL MEDICINE

## 2024-07-11 PROCEDURE — 99214 OFFICE O/P EST MOD 30 MIN: CPT | Performed by: INTERNAL MEDICINE

## 2024-07-11 PROCEDURE — 82570 ASSAY OF URINE CREATININE: CPT | Performed by: INTERNAL MEDICINE

## 2024-07-11 RX ORDER — METHOCARBAMOL 750 MG/1
750 TABLET, FILM COATED ORAL EVERY 6 HOURS PRN
Qty: 60 TABLET | Refills: 5 | Status: SHIPPED | OUTPATIENT
Start: 2024-07-11

## 2024-07-11 RX ORDER — PANTOPRAZOLE SODIUM 40 MG/1
40 TABLET, DELAYED RELEASE ORAL
Qty: 90 TABLET | Refills: 1 | Status: SHIPPED | OUTPATIENT
Start: 2024-07-11

## 2024-07-11 RX ORDER — AMLODIPINE AND VALSARTAN 5; 160 MG/1; MG/1
1 TABLET ORAL DAILY
Qty: 90 TABLET | Refills: 1 | Status: SHIPPED | OUTPATIENT
Start: 2024-07-11 | End: 2025-01-07

## 2024-07-11 RX ORDER — CITALOPRAM 40 MG/1
40 TABLET ORAL EVERY EVENING
Qty: 90 TABLET | Refills: 1 | Status: SHIPPED | OUTPATIENT
Start: 2024-07-11

## 2024-07-11 RX ORDER — CELECOXIB 100 MG/1
100 CAPSULE ORAL 2 TIMES DAILY
Qty: 180 CAPSULE | Refills: 1 | Status: SHIPPED | OUTPATIENT
Start: 2024-07-11

## 2024-07-11 RX ORDER — ATORVASTATIN CALCIUM 10 MG/1
10 TABLET, FILM COATED ORAL DAILY
Qty: 90 TABLET | Refills: 1 | Status: SHIPPED | OUTPATIENT
Start: 2024-07-11

## 2024-07-11 NOTE — ASSESSMENT & PLAN NOTE
Continue current medication.  We discussed possible reduction of Ozempic dosage.  Patient is showing a good response to Ozempic as far as her diabetic control is concerned but she has not lost any significant amount of weight.  We discussed transition to some of the other GLP-1 medications which are associated with a greater degree of weight loss.  Lab Results   Component Value Date    HGBA1C 6.7 (A) 07/11/2024

## 2024-07-11 NOTE — ASSESSMENT & PLAN NOTE
Citalopram was renewed.  This medication may actually be hindering her weight loss from GLP-1 medication.  We will continue to monitor her progress

## 2024-07-11 NOTE — PROGRESS NOTES
Ambulatory Visit  Name: Yadi Gillette      : 1980      MRN: [id number]  Encounter Provider: Gigi Quesada MD  Encounter Date: 2024   Encounter department: [facility] [place]    Assessment & Plan   1. Encounter for screening for malignant neoplasm of colon  Assessment & Plan:  Given prescription for Cologuard.  Orders:  -     Cologuard  2. Controlled type 2 diabetes mellitus without complication, without long-term current use of insulin (HCC)  Assessment & Plan:  Continue current medication.  We discussed possible reduction of Ozempic dosage.  Patient is showing a good response to Ozempic as far as her diabetic control is concerned but she has not lost any significant amount of weight.  We discussed transition to some of the other GLP-1 medications which are associated with a greater degree of weight loss.  Lab Results   Component Value Date    HGBA1C 6.7 (A) 2024     Orders:  -     semaglutide, 2 mg/dose, (Ozempic) 8 mg/ mL injection pen; Inject 0.75 mL (2 mg total) under the skin every 7 days  -     metFORMIN (GLUCOPHAGE) 500 mg tablet; Take 1 tablet (500 mg total) by mouth daily with breakfast  -     atorvastatin (LIPITOR) 10 mg tablet; Take 1 tablet (10 mg total) by mouth daily  -     amLODIPine-valsartan (EXFORGE) 5-160 MG per tablet; Take 1 tablet by mouth daily  -     POCT hemoglobin A1c  -     Albumin / creatinine urine ratio  3. Delayed gastric emptying  Assessment & Plan:  This is no longer a problem according to the patient  Orders:  -     pantoprazole (PROTONIX) 40 mg tablet; Take 1 tablet (40 mg total) by mouth daily before breakfast  4. Tension headache  -     methocarbamol (Robaxin-750) 750 mg tablet; Take 1 tablet (750 mg total) by mouth every 6 (six) hours as needed for muscle spasms  5. Primary osteoarthritis involving multiple joints  -     celecoxib (CeleBREX) 100 mg capsule; Take 1 capsule (100 mg total) by mouth 2 (two) times a  day  6. Benign essential HTN  Assessment & Plan:  Blood pressure is nicely controlled on Exforge 5-160  Orders:  -     amLODIPine-valsartan (EXFORGE) 5-160 MG per tablet; Take 1 tablet by mouth daily  7. Other depression  Assessment & Plan:  Citalopram was renewed.  This medication may actually be hindering her weight loss from GLP-1 medication.  We will continue to monitor her progress  Orders:  -     citalopram (CeleXA) 40 mg tablet; Take 1 tablet (40 mg total) by mouth every evening       History of Present Illness     Patient presents to the office for follow-up visit.  She is not very happy with her progress with the use of Ozempic.  She is on 2 mg weekly and has not really lost a significant amount of weight.  Her weight is 220 pounds she was 217 pounds 1 year ago.  We reviewed dietary habits and made some suggestions for changes that may prove beneficial.  She is currently taking 40 mg daily of citalopram which may also be a contributory factor in her failure to lose weight.  Her hemoglobin A1c was obtained during the office visit and was 6.7% which is the lowest it has been in a few years.  She has no other complaints at this time.  She recently switched jobs which proved to be quite beneficial for the patient since it is much less stressful and much less taxing mentally.  A lot of her GI symptoms have resolved with her employment change.  She did not get her other strong next week or 2.  She was encouraged to have her Cologuard test completed.        Review of Systems   Constitutional: Negative.  Negative for activity change, appetite change, chills, diaphoresis, fatigue, fever and unexpected weight change.   HENT: Negative.     Eyes: Negative.    Respiratory: Negative.     Cardiovascular: Negative.    Gastrointestinal: Negative.    Endocrine: Negative.    Genitourinary: Negative.    Musculoskeletal: Negative.    Skin: Negative.    Neurological: Negative.    Hematological: Negative.   "  Psychiatric/Behavioral:  The patient is not nervous/anxious.        Objective     /80 (BP Location: Left arm, Patient Position: Sitting, Cuff Size: Standard)   Pulse 80   Temp (!) 97.4 °F (36.3 °C) (Tympanic)   Ht 5' 7.32\" (1.71 m)   Wt 99.8 kg (220 lb)   SpO2 98%   BMI 34.13 kg/m²     Physical Exam  Vitals reviewed.   Constitutional:       General: She is not in acute distress.     Appearance: Normal appearance. She is obese. She is not ill-appearing, toxic-appearing or diaphoretic.   HENT:      Head: Normocephalic and atraumatic.      Right Ear: External ear normal.      Left Ear: External ear normal.      Nose: Nose normal.      Mouth/Throat:      Mouth: Mucous membranes are moist.   Eyes:      General: No scleral icterus.     Conjunctiva/sclera: Conjunctivae normal.      Pupils: Pupils are equal, round, and reactive to light.   Neck:      Vascular: No carotid bruit.   Cardiovascular:      Rate and Rhythm: Normal rate and regular rhythm.      Heart sounds: Normal heart sounds. No murmur heard.  Pulmonary:      Effort: Pulmonary effort is normal. No respiratory distress.      Breath sounds: Normal breath sounds.   Abdominal:      General: Abdomen is flat. There is no distension.      Tenderness: There is no abdominal tenderness.   Musculoskeletal:         General: No swelling.      Cervical back: Normal range of motion.      Right lower leg: No edema.      Left lower leg: No edema.   Skin:     Coloration: Skin is not jaundiced.      Findings: No bruising, erythema or rash.   Neurological:      General: No focal deficit present.      Mental Status: She is alert and oriented to person, place, and time. Mental status is at baseline.   Psychiatric:         Mood and Affect: Mood normal.         Behavior: Behavior normal.         Thought Content: Thought content normal.         Judgment: Judgment normal.       Administrative Statements           " focal deficit present.      Mental Status: She is alert and oriented to person, place, and time. Mental status is at baseline.   Psychiatric:         Mood and Affect: Mood normal.         Behavior: Behavior normal.         Thought Content: Thought content normal.         Judgment: Judgment normal.     Patient's shoes and socks removed.    Right Foot/Ankle   Right Foot Inspection  Skin Exam: skin normal and skin intact. No dry skin, no warmth, no callus, no erythema, no maceration, no abnormal color, no pre-ulcer, no ulcer and no callus.     Toe Exam: ROM and strength within normal limits. No swelling, no tenderness, erythema and  no right toe deformity    Sensory   Vibration: intact  Proprioception: intact  Monofilament testing: intact    Vascular  Capillary refills: < 3 seconds  The right DP pulse is 2+. The right PT pulse is 2+.     Left Foot/Ankle  Left Foot Inspection  Skin Exam: skin normal and skin intact. No dry skin, no warmth, no erythema, no maceration, normal color, no pre-ulcer, no ulcer and no callus.     Toe Exam: ROM and strength within normal limits. No swelling, no tenderness, no erythema and no left toe deformity.     Sensory   Vibration: intact  Proprioception: intact  Monofilament testing: intact    Vascular  Capillary refills: < 3 seconds  The left DP pulse is 2+. The left PT pulse is 2+.     Assign Risk Category  No deformity present  No loss of protective sensation  No weak pulses  Risk: 0     Administrative Statements

## 2024-08-10 PROBLEM — Z12.11 ENCOUNTER FOR SCREENING FOR MALIGNANT NEOPLASM OF COLON: Status: RESOLVED | Noted: 2024-07-11 | Resolved: 2024-08-10

## 2024-09-20 DIAGNOSIS — E11.9 CONTROLLED TYPE 2 DIABETES MELLITUS WITHOUT COMPLICATION, WITHOUT LONG-TERM CURRENT USE OF INSULIN (HCC): Primary | ICD-10-CM

## 2024-09-20 DIAGNOSIS — E66.09 CLASS 1 OBESITY DUE TO EXCESS CALORIES WITH SERIOUS COMORBIDITY AND BODY MASS INDEX (BMI) OF 34.0 TO 34.9 IN ADULT: ICD-10-CM

## 2024-09-22 ENCOUNTER — TELEPHONE (OUTPATIENT)
Age: 44
End: 2024-09-22

## 2024-09-22 NOTE — TELEPHONE ENCOUNTER
PA for tirzepatide (Mounjaro) 12.5 MG/0.5ML SUBMITTED     via    []CMM-KEY:    [x]Karime-Case ID # [id number]   []Faxed to plan   []Other website    []Phone call Case ID #      Office notes sent, clinical questions answered. Awaiting determination    Turnaround time for your insurance to make a decision on your Prior Authorization can take 7-21 business days.

## 2024-09-23 ENCOUNTER — TELEPHONE (OUTPATIENT)
Age: 44
End: 2024-09-23

## 2024-09-23 NOTE — TELEPHONE ENCOUNTER
PA for (Mounjaro) 12.5 MG/0.5ML SUBMITTED     via    []CMM-KEY:   [x]Surescripts-Case ID #   []Faxed to plan   []Other website   []Phone call Case ID #     Office notes sent, clinical questions answered. Awaiting determination    Turnaround time for your insurance to make a decision on your Prior Authorization can take 7-21 business days.

## 2024-09-26 NOTE — TELEPHONE ENCOUNTER
PA for mounjaro  APPROVED     Date(s) approved August 25, 2024 to September 24, 2025       Patient advised by          [x]Squarespacehart Message  []Phone call   []LMOM  [x]L/M to call office as no active Communication consent on file  []Unable to leave detailed message as VM not approved on Communication consent       Pharmacy advised by    [x]Fax  []Phone call    Approval letter scanned into Media Yes

## 2024-10-02 ENCOUNTER — OFFICE VISIT (OUTPATIENT)
Dept: URGENT CARE | Facility: CLINIC | Age: 44
End: 2024-10-02
Payer: COMMERCIAL

## 2024-10-02 ENCOUNTER — TELEPHONE (OUTPATIENT)
Age: 44
End: 2024-10-02

## 2024-10-02 VITALS
OXYGEN SATURATION: 99 % | WEIGHT: 215 LBS | TEMPERATURE: 98 F | BODY MASS INDEX: 32.58 KG/M2 | SYSTOLIC BLOOD PRESSURE: 118 MMHG | HEIGHT: 68 IN | HEART RATE: 78 BPM | DIASTOLIC BLOOD PRESSURE: 70 MMHG

## 2024-10-02 DIAGNOSIS — J01.10 ACUTE NON-RECURRENT FRONTAL SINUSITIS: Primary | ICD-10-CM

## 2024-10-02 PROCEDURE — S9083 URGENT CARE CENTER GLOBAL: HCPCS | Performed by: FAMILY MEDICINE

## 2024-10-02 PROCEDURE — G0382 LEV 3 HOSP TYPE B ED VISIT: HCPCS | Performed by: FAMILY MEDICINE

## 2024-10-02 RX ORDER — METHYLPREDNISOLONE 4 MG
TABLET, DOSE PACK ORAL
Qty: 21 TABLET | Refills: 0 | Status: SHIPPED | OUTPATIENT
Start: 2024-10-02

## 2024-10-02 RX ORDER — AZITHROMYCIN 250 MG/1
TABLET, FILM COATED ORAL
Qty: 6 TABLET | Refills: 0 | Status: SHIPPED | OUTPATIENT
Start: 2024-10-02 | End: 2024-10-06

## 2024-10-02 NOTE — TELEPHONE ENCOUNTER
Pt called stating that she is having sinus pain and pressure and a toothache. She cannot get into her dentist until Monday and does not want to wait that long for relief. She is not sure if the tooth pain is related to sinuses. Her dentist office suggested she call her PCP to request an antibiotic until she can get in to see him on Monday. She has an appt with urgent care this afternoon but wanted to see if the script could just be called in. I also offered her Dr. JOHNSON tomorrow, declined.    If the antibiotic is possible, she also requested Fluconazole as she gets yeast infections with antibiotics.    Please advise patient at     [phone]     [id number] [place] [place] [place] [facility] [address] [phone]

## 2024-10-02 NOTE — PROGRESS NOTES
[facility]        NAME: Yadi Gillette is a 44 y.o. female  : 1980    MRN: [id number]  DATE: 2024  TIME: 4:30 PM    Assessment and Plan   Acute non-recurrent frontal sinusitis [J01.10]  1. Acute non-recurrent frontal sinusitis  azithromycin (ZITHROMAX) 250 mg tablet    methylPREDNISolone 4 MG tablet therapy pack            Patient Instructions       Follow up with PCP in 3-5 days.  Proceed to  ER if symptoms worsen.    If tests have been performed at [facility], our office will contact you with results if changes need to be made to the care plan discussed with you at the visit.  You can review your full results on [facility].    Chief Complaint     Chief Complaint   Patient presents with    Nasal Congestion     C/o L sinus, L ear, and L gums ache, sore throat, fever, chills, & nasal congestion onset Saturday. Denies cough or SOB. Pt taking tylenol with no relief.          History of Present Illness       44-year-old female with 2-week history of increased nasal congestion, sinus pressure and cough.  Her cough is now becoming green and productive.  The pressure in her sinuses now moving into the upper portion of her teeth.  Denies any fevers or chills.        Review of Systems   Review of Systems   Constitutional: Negative.    HENT:  Positive for congestion, sinus pressure and sinus pain.    Eyes: Negative.    Respiratory: Negative.     Cardiovascular: Negative.    Gastrointestinal: Negative.    Genitourinary: Negative.    Skin: Negative.    Allergic/Immunologic: Negative.    Neurological: Negative.    Hematological: Negative.    Psychiatric/Behavioral: Negative.           Current Medications       Current Outpatient Medications:     amLODIPine-valsartan (EXFORGE) 5-160 MG per tablet, Take 1 tablet by mouth daily, Disp: 90 tablet, Rfl: 1    ascorbic acid (VITAMIN C) 500 mg tablet, Take 1,000 mg by mouth daily  , Disp: , Rfl:     atorvastatin (LIPITOR) 10 mg tablet, Take 1 tablet  (10 mg total) by mouth daily, Disp: 90 tablet, Rfl: 1    azithromycin (ZITHROMAX) 250 mg tablet, Take 2 tablets today then 1 tablet daily x 4 days, Disp: 6 tablet, Rfl: 0    celecoxib (CeleBREX) 100 mg capsule, Take 1 capsule (100 mg total) by mouth 2 (two) times a day, Disp: 180 capsule, Rfl: 1    cetirizine (ZyrTEC) 10 mg tablet, Take 10 mg by mouth daily, Disp: , Rfl:     Cholecalciferol (VITAMIN D3) 5000 units CAPS, Take 1 capsule by mouth daily, Disp: , Rfl:     citalopram (CeleXA) 40 mg tablet, Take 1 tablet (40 mg total) by mouth every evening, Disp: 90 tablet, Rfl: 1    metFORMIN (GLUCOPHAGE) 500 mg tablet, Take 1 tablet (500 mg total) by mouth daily with breakfast, Disp: 90 tablet, Rfl: 1    methocarbamol (Robaxin-750) 750 mg tablet, Take 1 tablet (750 mg total) by mouth every 6 (six) hours as needed for muscle spasms, Disp: 60 tablet, Rfl: 5    methylPREDNISolone 4 MG tablet therapy pack, Use as directed on package, Disp: 21 tablet, Rfl: 0    pantoprazole (PROTONIX) 40 mg tablet, Take 1 tablet (40 mg total) by mouth daily before breakfast, Disp: 90 tablet, Rfl: 1    valACYclovir (VALTREX) 500 mg tablet, Take 500 mg by mouth daily as needed, Disp: , Rfl:     tirzepatide (Mounjaro) 12.5 MG/0.5ML, Inject 0.5 mL (12.5 mg total) under the skin every 7 days (Patient not taking: Reported on 10/2/2024), Disp: 2 mL, Rfl: 2    Current Allergies     Allergies as of 10/02/2024 - Reviewed 10/02/2024   Allergen Reaction Noted    Lotrel [amlodipine besy-benazepril hcl] Headache 01/18/2021    Percocet [oxycodone-acetaminophen] Itching 05/04/2016    Hydrocodone Itching 04/14/2020    Other  05/09/2016    Oxycodone Itching 04/14/2020    Pollen extract  05/09/2016    Tramadol Itching 01/19/2018            The following portions of the patient's history were reviewed and updated as appropriate: allergies, current medications, past family history, past medical history, past social history, past surgical history and problem  "list.     Past Medical History:   Diagnosis Date    Abscess of apex of dental root complicating chronic inflammation 2018    Acute bronchitis 2018    Anxiety     Depression 2016    Diabetes mellitus (HCC)     NIDDM    Flu-like symptoms 2018    Herpes simplex infection     Hypertension     Obesity 2020    Primary osteoarthritis involving multiple joints 2022    Request for sterilization     Right maxillary sinusitis 2018    Scratch     left lower arm- healing- scabbed    Seasonal allergies     Strep throat 3/2/2018       Past Surgical History:   Procedure Laterality Date     SECTION      OVARIAN CYST SURGERY      RI LAPAROSCOPY FULGURATION OVIDUCTS N/A 2017    Procedure: LIGATION/COAGULATION TUBAL LAPAROSCOPIC; LYSIS OF ADHESIONS;  Surgeon: Beverly Noble DO;  Location: [facility];  Service: Gynecology    TUBAL LIGATION  2018    WISDOM TOOTH EXTRACTION         Family History   Problem Relation Age of Onset    Diabetes Mother     Hypertension Mother     Cancer Mother         Uterine    Uterine cancer Mother 65    Diabetes type II Father     Heart disease Father         cardiac disorder     Hypertension Father     Diabetes Father     No Known Problems Maternal Grandmother     No Known Problems Maternal Grandfather     No Known Problems Paternal Grandmother     Colon cancer Paternal Grandfather     No Known Problems Son     No Known Problems Maternal Aunt     No Known Problems Maternal Aunt     No Known Problems Maternal Aunt     No Known Problems Maternal Aunt     No Known Problems Maternal Aunt     No Known Problems Paternal Aunt          Medications have been verified.        Objective   /70 (BP Location: Left arm, Patient Position: Sitting)   Pulse 78   Temp 98 °F (36.7 °C)   Ht 5' 8\" (1.727 m)   Wt 97.5 kg (215 lb)   SpO2 99%   BMI 32.69 kg/m²   No LMP recorded.       Physical Exam     Physical Exam  Vitals and nursing note reviewed.   Constitutional:  "      Appearance: She is well-developed.   HENT:      Head: Normocephalic.      Nose: Congestion present.   Eyes:      Pupils: Pupils are equal, round, and reactive to light.   Cardiovascular:      Rate and Rhythm: Normal rate and regular rhythm.   Pulmonary:      Effort: Pulmonary effort is normal.   Abdominal:      General: Abdomen is flat.   Musculoskeletal:         General: Normal range of motion.      Cervical back: Normal range of motion.   Skin:     General: Skin is warm and dry.   Neurological:      Mental Status: She is alert and oriented to person, place, and time.

## 2024-10-03 DIAGNOSIS — J01.10 ACUTE NON-RECURRENT FRONTAL SINUSITIS: ICD-10-CM

## 2024-10-09 DIAGNOSIS — Z00.6 ENCOUNTER FOR EXAMINATION FOR NORMAL COMPARISON OR CONTROL IN CLINICAL RESEARCH PROGRAM: ICD-10-CM

## 2024-10-24 DIAGNOSIS — E66.09 CLASS 1 OBESITY DUE TO EXCESS CALORIES WITH SERIOUS COMORBIDITY AND BODY MASS INDEX (BMI) OF 32.0 TO 32.9 IN ADULT: Primary | ICD-10-CM

## 2024-10-24 DIAGNOSIS — E66.811 CLASS 1 OBESITY DUE TO EXCESS CALORIES WITH SERIOUS COMORBIDITY AND BODY MASS INDEX (BMI) OF 32.0 TO 32.9 IN ADULT: Primary | ICD-10-CM

## 2024-10-24 RX ORDER — PHENTERMINE HYDROCHLORIDE 37.5 MG/1
37.5 TABLET ORAL DAILY
Qty: 30 TABLET | Refills: 2 | Status: SHIPPED | OUTPATIENT
Start: 2024-10-24

## 2024-10-24 NOTE — PROGRESS NOTES
The patient was provided with a prescription for phentermine 37.5 mg with 2 refills to be taken once daily in the morning

## 2024-10-28 DIAGNOSIS — E11.9 CONTROLLED TYPE 2 DIABETES MELLITUS WITHOUT COMPLICATION, WITHOUT LONG-TERM CURRENT USE OF INSULIN (HCC): ICD-10-CM

## 2024-10-31 ENCOUNTER — TELEPHONE (OUTPATIENT)
Age: 44
End: 2024-10-31

## 2024-10-31 NOTE — TELEPHONE ENCOUNTER
PA for (Ozempic) 8 mg/ mL SUBMITTED     via    []CMM-KEY:   [x]Surescripts-Case ID #   []Availity-Auth ID # NDC #   []Faxed to plan   []Other website   []Phone call Case ID #     Office notes sent, clinical questions answered. Awaiting determination    Turnaround time for your insurance to make a decision on your Prior Authorization can take 7-21 business days.

## 2024-11-01 PROBLEM — J01.10 ACUTE NON-RECURRENT FRONTAL SINUSITIS: Status: RESOLVED | Noted: 2024-10-02 | Resolved: 2024-11-01

## 2024-11-01 NOTE — TELEPHONE ENCOUNTER
PA for  (Ozempic) 8 mg/ mL APPROVED     Date(s) approved 10/31/2024 to 09/24/2025     Case #    Patient advised by          [x]MyChart Message  []Phone call   []LMOM  []L/M to call office as no active Communication consent on file  []Unable to leave detailed message as VM not approved on Communication consent       Pharmacy advised by    [x]Fax  []Phone call    Approval letter scanned into Media Yes

## 2024-12-02 RX ORDER — AZITHROMYCIN 250 MG/1
TABLET, FILM COATED ORAL
Qty: 6 TABLET | Refills: 0 | OUTPATIENT
Start: 2024-12-02

## 2024-12-02 RX ORDER — METHYLPREDNISOLONE 4 MG/1
TABLET ORAL
Qty: 21 TABLET | Refills: 0 | OUTPATIENT
Start: 2024-12-02

## 2025-01-04 DIAGNOSIS — M15.0 PRIMARY OSTEOARTHRITIS INVOLVING MULTIPLE JOINTS: ICD-10-CM

## 2025-01-04 DIAGNOSIS — E11.9 CONTROLLED TYPE 2 DIABETES MELLITUS WITHOUT COMPLICATION, WITHOUT LONG-TERM CURRENT USE OF INSULIN (HCC): ICD-10-CM

## 2025-01-04 DIAGNOSIS — I10 BENIGN ESSENTIAL HTN: ICD-10-CM

## 2025-01-04 DIAGNOSIS — K30 DELAYED GASTRIC EMPTYING: ICD-10-CM

## 2025-01-06 RX ORDER — AMLODIPINE AND VALSARTAN 5; 160 MG/1; MG/1
1 TABLET ORAL DAILY
Qty: 90 TABLET | Refills: 1 | Status: SHIPPED | OUTPATIENT
Start: 2025-01-06 | End: 2025-07-05

## 2025-01-06 RX ORDER — CELECOXIB 100 MG/1
100 CAPSULE ORAL 2 TIMES DAILY
Qty: 180 CAPSULE | Refills: 1 | Status: SHIPPED | OUTPATIENT
Start: 2025-01-06

## 2025-01-06 RX ORDER — ATORVASTATIN CALCIUM 10 MG/1
10 TABLET, FILM COATED ORAL DAILY
Qty: 90 TABLET | Refills: 1 | Status: SHIPPED | OUTPATIENT
Start: 2025-01-06

## 2025-01-06 RX ORDER — PANTOPRAZOLE SODIUM 40 MG/1
40 TABLET, DELAYED RELEASE ORAL
Qty: 90 TABLET | Refills: 1 | Status: SHIPPED | OUTPATIENT
Start: 2025-01-06

## 2025-02-01 LAB
ALBUMIN SERPL-MCNC: 4.1 G/DL (ref 3.5–5.7)
ALBUMIN/CREAT UR: 23
ALP SERPL-CCNC: 82 U/L (ref 35–120)
ALT SERPL-CCNC: 17 U/L
ANION GAP SERPL CALCULATED.3IONS-SCNC: 9 MMOL/L (ref 3–11)
AST SERPL-CCNC: 12 U/L
BILIRUB SERPL-MCNC: 0.4 MG/DL (ref 0.2–1)
BUN SERPL-MCNC: 14 MG/DL (ref 7–25)
CALCIUM SERPL-MCNC: 9.4 MG/DL (ref 8.5–10.5)
CHLORIDE SERPL-SCNC: 105 MMOL/L (ref 100–109)
CHOLEST SERPL-MCNC: 194 MG/DL
CHOLEST/HDLC SERPL: 2.8 {RATIO}
CO2 SERPL-SCNC: 28 MMOL/L (ref 21–31)
CREAT SERPL-MCNC: 0.71 MG/DL (ref 0.4–1.1)
CREAT UR-MCNC: 125.9 MG/DL (ref 50–200)
CYTOLOGY CMNT CVX/VAG CYTO-IMP: ABNORMAL
ERYTHROCYTE [DISTWIDTH] IN BLOOD BY AUTOMATED COUNT: 13 % (ref 12–16)
EST. AVERAGE GLUCOSE BLD GHB EST-MCNC: 166 MG/DL
GFR/BSA.PRED SERPLBLD CYS-BASED-ARV: 106 ML/MIN/{1.73_M2}
GLUCOSE SERPL-MCNC: 133 MG/DL (ref 65–99)
HBA1C MFR BLD: 7.4 %
HCT VFR BLD AUTO: 37.4 % (ref 35–43)
HDLC SERPL-MCNC: 69 MG/DL (ref 23–92)
HGB BLD-MCNC: 12.5 G/DL (ref 11.5–14.5)
LDLC SERPL CALC-MCNC: 108 MG/DL
MCH RBC QN AUTO: 28.5 PG (ref 26–34)
MCHC RBC AUTO-ENTMCNC: 33.4 G/DL (ref 32–37)
MCV RBC AUTO: 85 FL (ref 80–100)
MICROALBUMIN UR-MCNC: 2.9 MG/DL
NONHDLC SERPL-MCNC: 125 MG/DL
PLATELET # BLD AUTO: 192 THOU/CMM (ref 140–350)
PMV BLD REES-ECKER: 10.6 FL (ref 7.5–11.3)
POTASSIUM SERPL-SCNC: 4.5 MMOL/L (ref 3.5–5.2)
PROT SERPL-MCNC: 6.9 G/DL (ref 6.3–8.3)
RBC # BLD AUTO: 4.39 MILL/CMM (ref 3.7–4.7)
SODIUM SERPL-SCNC: 142 MMOL/L (ref 135–145)
TRIGL SERPL-MCNC: 87 MG/DL
WBC # BLD AUTO: 8.6 THOU/CMM (ref 4–10)

## 2025-02-07 ENCOUNTER — OFFICE VISIT (OUTPATIENT)
Age: 45
End: 2025-02-07
Payer: COMMERCIAL

## 2025-02-07 VITALS
RESPIRATION RATE: 16 BRPM | TEMPERATURE: 98.8 F | HEART RATE: 85 BPM | WEIGHT: 227.6 LBS | BODY MASS INDEX: 35.72 KG/M2 | SYSTOLIC BLOOD PRESSURE: 112 MMHG | HEIGHT: 67 IN | DIASTOLIC BLOOD PRESSURE: 72 MMHG

## 2025-02-07 DIAGNOSIS — Z12.31 ENCOUNTER FOR SCREENING MAMMOGRAM FOR BREAST CANCER: ICD-10-CM

## 2025-02-07 DIAGNOSIS — E66.811 CLASS 1 OBESITY DUE TO EXCESS CALORIES WITH SERIOUS COMORBIDITY AND BODY MASS INDEX (BMI) OF 34.0 TO 34.9 IN ADULT: ICD-10-CM

## 2025-02-07 DIAGNOSIS — I10 BENIGN ESSENTIAL HTN: Primary | ICD-10-CM

## 2025-02-07 DIAGNOSIS — E66.09 CLASS 1 OBESITY DUE TO EXCESS CALORIES WITH SERIOUS COMORBIDITY AND BODY MASS INDEX (BMI) OF 34.0 TO 34.9 IN ADULT: ICD-10-CM

## 2025-02-07 DIAGNOSIS — E11.9 CONTROLLED TYPE 2 DIABETES MELLITUS WITHOUT COMPLICATION, WITHOUT LONG-TERM CURRENT USE OF INSULIN (HCC): ICD-10-CM

## 2025-02-07 DIAGNOSIS — F32.89 OTHER DEPRESSION: ICD-10-CM

## 2025-02-07 DIAGNOSIS — Z12.11 ENCOUNTER FOR SCREENING FOR MALIGNANT NEOPLASM OF COLON: ICD-10-CM

## 2025-02-07 PROCEDURE — 99214 OFFICE O/P EST MOD 30 MIN: CPT | Performed by: INTERNAL MEDICINE

## 2025-02-07 RX ORDER — CITALOPRAM HYDROBROMIDE 40 MG/1
40 TABLET ORAL EVERY EVENING
Qty: 100 TABLET | Refills: 1 | Status: SHIPPED | OUTPATIENT
Start: 2025-02-07

## 2025-02-08 NOTE — ASSESSMENT & PLAN NOTE
Depression Screening Follow-up Plan: Patient's depression screening was positive with a PHQ-9 score of 7. Patient with underlying depression and was advised to continue current medications as prescribed.    Orders:    citalopram (CeleXA) 40 mg tablet; Take 1 tablet (40 mg total) by mouth every evening

## 2025-02-08 NOTE — ASSESSMENT & PLAN NOTE
Suboptimal glycemic control with A1c at 7.4% since the change over to Ozempic.  We will restart tirzepatide at 12.5 mg weekly and follow-up labs in 6 months  Lab Results   Component Value Date    HGBA1C 7.4 (H) 02/01/2025       Orders:    metFORMIN (GLUCOPHAGE) 500 mg tablet; Take 1 tablet (500 mg total) by mouth daily with breakfast    Tirzepatide 12.5 MG/0.5ML SOAJ; Inject 12.5 mg under the skin once a week    CBC and differential; Future    Comprehensive metabolic panel; Future    Hemoglobin A1C; Future

## 2025-02-08 NOTE — PROGRESS NOTES
Name: Yadi Gillette      : 1980      MRN: [id number]  Encounter Provider: Gigi Quesada MD  Encounter Date: 2025   [facility]  :  Assessment & Plan  Encounter for screening mammogram for breast cancer  Mammogram ordered  Orders:    Mammo screening bilateral w 3d and cad; Future    Other depression  Depression Screening Follow-up Plan: Patient's depression screening was positive with a PHQ-9 score of 7. Patient with underlying depression and was advised to continue current medications as prescribed.    Orders:    citalopram (CeleXA) 40 mg tablet; Take 1 tablet (40 mg total) by mouth every evening    Controlled type 2 diabetes mellitus without complication, without long-term current use of insulin (HCC)  Suboptimal glycemic control with A1c at 7.4% since the change over to Ozempic.  We will restart tirzepatide at 12.5 mg weekly and follow-up labs in 6 months  Lab Results   Component Value Date    HGBA1C 7.4 (H) 2025       Orders:    metFORMIN (GLUCOPHAGE) 500 mg tablet; Take 1 tablet (500 mg total) by mouth daily with breakfast    Tirzepatide 12.5 MG/0.5ML SOAJ; Inject 12.5 mg under the skin once a week    CBC and differential; Future    Comprehensive metabolic panel; Future    Hemoglobin A1C; Future    Class 1 obesity due to excess calories with serious comorbidity and body mass index (BMI) of 34.0 to 34.9 in adult  Patient has experienced some weight gain since transitioning to Ozempic from tirzepatide and would like to transition back..  Will resume his appetite.  12.5 mg weekly    Orders:    Tirzepatide 12.5 MG/0.5ML SOAJ; Inject 12.5 mg under the skin once a week    CBC and differential; Future    Comprehensive metabolic panel; Future    Hemoglobin A1C; Future    Benign essential HTN  Continues on amlodipine valsartan 1960  Orders:    CBC and differential; Future    Comprehensive metabolic panel; Future    Encounter for  "screening for malignant neoplasm of colon  Stool fit test  Orders:    Occult Blood, Fecal, IA; Future           History of Present Illness   The patient presents to the office for a 6-month follow-up visit for type 2 diabetes.  She had been transition to Ozempic but has not had the control of her blood sugar that she had previously with tirzepatide and would like to go back on this medication.  Labs were reviewed.  Hemoglobin A1c is 7.4%.  Urinalysis was unremarkable for any significant proteinuria.  CBC was within normal limits.  Lipids are near goal levels.  Total cholesterol is 194, triglycerides 87, HDL cholesterol 69 and LDL cholesterol 108.  CMP was essentially normal with the exception of a fasting glucose of 138.  Patient has no other significant issues at this time generally feeling well without any, physically.      Review of Systems   Constitutional: Negative.  Negative for activity change, appetite change, chills, diaphoresis, fatigue, fever and unexpected weight change.   HENT: Negative.     Eyes: Negative.    Respiratory: Negative.     Cardiovascular: Negative.    Gastrointestinal: Negative.    Endocrine: Negative.    Genitourinary: Negative.    Musculoskeletal: Negative.    Skin: Negative.    Neurological: Negative.    Hematological: Negative.    Psychiatric/Behavioral:  The patient is not nervous/anxious.        Objective   /72 (BP Location: Left arm, Patient Position: Sitting, Cuff Size: Large)   Pulse 85   Temp 98.8 °F (37.1 °C) (Temporal)   Resp (!) 98   Ht 5' 7.13\" (1.705 m)   Wt 103 kg (227 lb 9.6 oz)   BMI 35.51 kg/m²      Physical Exam  Vitals reviewed.   Constitutional:       General: She is not in acute distress.     Appearance: Normal appearance. She is obese. She is not ill-appearing, toxic-appearing or diaphoretic.   HENT:      Head: Normocephalic and atraumatic.      Right Ear: External ear normal.      Left Ear: External ear normal.      Nose: Nose normal.      Mouth/Throat: "      Mouth: Mucous membranes are moist.   Eyes:      General: No scleral icterus.     Conjunctiva/sclera: Conjunctivae normal.      Pupils: Pupils are equal, round, and reactive to light.   Cardiovascular:      Rate and Rhythm: Normal rate and regular rhythm.      Heart sounds: Normal heart sounds. No murmur heard.  Pulmonary:      Effort: Pulmonary effort is normal. No respiratory distress.      Breath sounds: Normal breath sounds.   Abdominal:      General: Abdomen is flat. Bowel sounds are normal. There is no distension.   Musculoskeletal:      Cervical back: Neck supple.      Right lower leg: No edema.      Left lower leg: No edema.   Skin:     General: Skin is warm.      Coloration: Skin is not jaundiced.      Findings: No bruising, erythema or rash.   Neurological:      General: No focal deficit present.      Mental Status: She is alert and oriented to person, place, and time. Mental status is at baseline.   Psychiatric:         Mood and Affect: Mood normal.         Behavior: Behavior normal.

## 2025-02-08 NOTE — ASSESSMENT & PLAN NOTE
Patient has experienced some weight gain since transitioning to Ozempic from tirzepatide and would like to transition back..  Will resume his appetite.  12.5 mg weekly    Orders:    Tirzepatide 12.5 MG/0.5ML SOAJ; Inject 12.5 mg under the skin once a week    CBC and differential; Future    Comprehensive metabolic panel; Future    Hemoglobin A1C; Future

## 2025-02-08 NOTE — ASSESSMENT & PLAN NOTE
Depression Screening Follow-up Plan: Patient's depression screening was positive with a PHQ-9 score of 7. Patient with underlying depression and was advised to continue current medications as prescribed.  Currently utilizing citalopram 40 mg daily

## 2025-02-10 ENCOUNTER — TELEPHONE (OUTPATIENT)
Dept: ADMINISTRATIVE | Facility: OTHER | Age: 45
End: 2025-02-10

## 2025-02-10 NOTE — LETTER
Procedure Request Form: Cervical Cancer Screening      Date Requested: 02/10/25  Patient: Yadi Gillette  Patient : 1980   Referring Provider: Gigi Quesada MD        Date of Procedure ______________________________       The above patient has informed us that they have completed their   most recent Cervical Cancer Screening at your facility. Please complete   this form and attach all corresponding procedure reports/results.    Comments __________________________________________________________  ____________________________________________________________________  ____________________________________________________________________  ____________________________________________________________________    Facility Completing Procedure _________________________________________    Form Completed By (print name) _______________________________________      Signature __________________________________________________________      These reports are needed for  compliance.    Please fax this completed form and a copy of the procedure report to our office located at [address] [place] [address] as soon as possible to Fax [phone] rhiannon Abel: Phone [phone]    We thank you for your assistance in treating our mutual patient.

## 2025-02-10 NOTE — TELEPHONE ENCOUNTER
Upon review of the In Basket request and the patient's chart, initial outreach has been made via fax to facility. Please see Contacts section for details.     Thank you  Colten Eckert MA

## 2025-02-10 NOTE — TELEPHONE ENCOUNTER
----- Message from Chloe VILCHIS sent at 2/7/2025  3:19 PM EST -----  Regarding: pap - [facility]pc  02/07/25 3:19 PM    Hello, our patient Yadi Gillette has had Pap Smear (HPV) aka Cervical Cancer Screening completed/performed. Please assist in updating the patient chart by making an External outreach to [facility] facility located in [place]. The date of service is 2023.    Thank you,  Chloe Bernal MA  [facility] PRIMARY CARE [place]

## 2025-02-12 NOTE — TELEPHONE ENCOUNTER
Upon review of the In Basket request we were able to locate, review, and update the patient chart as requested for Pap Smear (HPV) aka Cervical Cancer Screening.    Any additional questions or concerns should be emailed to the Practice Liaisons via the appropriate education email address, please do not reply via In Basket.    Thank you  Colten Eckert MA   [facility]

## 2025-06-11 LAB
LEFT EYE DIABETIC RETINOPATHY: NORMAL
RIGHT EYE DIABETIC RETINOPATHY: NORMAL

## 2025-07-06 ENCOUNTER — OFFICE VISIT (OUTPATIENT)
Dept: URGENT CARE | Age: 45
End: 2025-07-06
Payer: COMMERCIAL

## 2025-07-06 VITALS — OXYGEN SATURATION: 99 % | RESPIRATION RATE: 20 BRPM | TEMPERATURE: 98 F | HEART RATE: 88 BPM

## 2025-07-06 DIAGNOSIS — J02.9 ACUTE PHARYNGITIS, UNSPECIFIED ETIOLOGY: Primary | ICD-10-CM

## 2025-07-06 DIAGNOSIS — J01.00 ACUTE MAXILLARY SINUSITIS, RECURRENCE NOT SPECIFIED: ICD-10-CM

## 2025-07-06 DIAGNOSIS — J40 BRONCHITIS: ICD-10-CM

## 2025-07-06 LAB — S PYO AG THROAT QL: NEGATIVE

## 2025-07-06 PROCEDURE — S9083 URGENT CARE CENTER GLOBAL: HCPCS | Performed by: PHYSICIAN ASSISTANT

## 2025-07-06 PROCEDURE — 87880 STREP A ASSAY W/OPTIC: CPT | Performed by: PHYSICIAN ASSISTANT

## 2025-07-06 PROCEDURE — G0382 LEV 3 HOSP TYPE B ED VISIT: HCPCS | Performed by: PHYSICIAN ASSISTANT

## 2025-07-06 RX ORDER — BENZONATATE 100 MG/1
100 CAPSULE ORAL 3 TIMES DAILY PRN
Qty: 20 CAPSULE | Refills: 0 | Status: SHIPPED | OUTPATIENT
Start: 2025-07-06

## 2025-07-06 RX ORDER — AZITHROMYCIN 250 MG/1
TABLET, FILM COATED ORAL
Qty: 6 TABLET | Refills: 0 | Status: SHIPPED | OUTPATIENT
Start: 2025-07-06 | End: 2025-07-10

## 2025-07-06 RX ORDER — FLUTICASONE PROPIONATE 50 MCG
1 SPRAY, SUSPENSION (ML) NASAL DAILY
Qty: 9.9 ML | Refills: 0 | Status: SHIPPED | OUTPATIENT
Start: 2025-07-06

## 2025-07-06 NOTE — PROGRESS NOTES
[facility]        NAME: Yadi Gillette is a 45 y.o. female  : 1980    MRN: [id number]  DATE: 2025  TIME: 3:51 PM    Pulse 88   Temp 98 °F (36.7 °C) (Tympanic)   Resp 20   SpO2 99%     Assessment and Plan   Acute pharyngitis, unspecified etiology [J02.9]  1. Acute pharyngitis, unspecified etiology  POCT rapid ANTIGEN strepA    azithromycin (ZITHROMAX) 250 mg tablet    benzonatate (TESSALON PERLES) 100 mg capsule    fluticasone (FLONASE) 50 mcg/act nasal spray      2. Acute maxillary sinusitis, recurrence not specified  azithromycin (ZITHROMAX) 250 mg tablet    benzonatate (TESSALON PERLES) 100 mg capsule    fluticasone (FLONASE) 50 mcg/act nasal spray      3. Bronchitis  azithromycin (ZITHROMAX) 250 mg tablet    benzonatate (TESSALON PERLES) 100 mg capsule    fluticasone (FLONASE) 50 mcg/act nasal spray            Patient Instructions       Follow up with PCP in 3-5 days.  Proceed to  ER if symptoms worsen.    Chief Complaint     Chief Complaint   Patient presents with    Sore Throat    Headache     Yesterday and today has had a bad headache.     Thrush     2 weeks ago had a white coating on her tongue and now feels like her throat is fuzzy    Cold Like Symptoms     Having nasal congestion. Started yesterday. Dry, nonproductive cough         History of Present Illness       Pt with sore throat slight productive cough and white film on tongue for 1 week     Sore Throat   Associated symptoms include coughing and headaches.   Headache      Review of Systems   Review of Systems   Constitutional: Negative.    HENT:  Positive for sore throat.    Eyes: Negative.    Respiratory:  Positive for cough.    Cardiovascular: Negative.    Gastrointestinal: Negative.    Endocrine: Negative.    Genitourinary: Negative.    Musculoskeletal: Negative.    Skin: Negative.    Allergic/Immunologic: Negative.    Neurological:  Positive for headaches.   Hematological: Negative.    Psychiatric/Behavioral:  Negative.     All other systems reviewed and are negative.        Current Medications     Current Medications[1]    Current Allergies     Allergies as of 07/06/2025 - Reviewed 07/06/2025   Allergen Reaction Noted    Lotrel [amlodipine besy-benazepril hcl] Headache 01/18/2021    Percocet [oxycodone-acetaminophen] Itching 05/04/2016    Hydrocodone Itching 04/14/2020    Other  05/09/2016    Oxycodone Itching 04/14/2020    Pollen extract  05/09/2016    Tramadol Itching 01/19/2018            The following portions of the patient's history were reviewed and updated as appropriate: allergies, current medications, past family history, past medical history, past social history, past surgical history and problem list.     Past Medical History[2]    Past Surgical History[3]    Family History[4]      Medications have been verified.        Objective   Pulse 88   Temp 98 °F (36.7 °C) (Tympanic)   Resp 20   SpO2 99%        Physical Exam     Physical Exam  Vitals and nursing note reviewed.   Constitutional:       Appearance: She is well-developed and normal weight.      Comments: Headache is frontal    HENT:      Head: Normocephalic and atraumatic.      Right Ear: Tympanic membrane and ear canal normal.      Left Ear: Tympanic membrane and ear canal normal.      Nose: Congestion and rhinorrhea present.      Comments: Sinus tender to palp      Mouth/Throat:      Pharynx: Uvula midline. Posterior oropharyngeal erythema present.      Tonsils: No tonsillar exudate or tonsillar abscesses.     Cardiovascular:      Rate and Rhythm: Normal rate and regular rhythm.      Heart sounds: Normal heart sounds.   Pulmonary:      Effort: Pulmonary effort is normal.      Breath sounds: Normal breath sounds.      Comments: Minor coarse sounds cleared with cough   Abdominal:      General: Bowel sounds are normal.      Palpations: Abdomen is soft.     Musculoskeletal:      Cervical back: Normal range of motion and neck supple.   Lymphadenopathy:       Cervical: Cervical adenopathy present.     Skin:     General: Skin is warm.     Neurological:      Mental Status: She is alert and oriented to person, place, and time.                          [1]   Current Outpatient Medications:     azithromycin (ZITHROMAX) 250 mg tablet, Take 2 tablets today then 1 tablet daily x 4 days, Disp: 6 tablet, Rfl: 0    benzonatate (TESSALON PERLES) 100 mg capsule, Take 1 capsule (100 mg total) by mouth 3 (three) times a day as needed for cough, Disp: 20 capsule, Rfl: 0    celecoxib (CeleBREX) 100 mg capsule, Take 1 capsule (100 mg total) by mouth 2 (two) times a day, Disp: 180 capsule, Rfl: 1    cetirizine (ZyrTEC) 10 mg tablet, Take 10 mg by mouth in the morning., Disp: , Rfl:     citalopram (CeleXA) 40 mg tablet, Take 1 tablet (40 mg total) by mouth every evening, Disp: 100 tablet, Rfl: 1    fluticasone (FLONASE) 50 mcg/act nasal spray, 1 spray into each nostril daily, Disp: 9.9 mL, Rfl: 0    metFORMIN (GLUCOPHAGE) 500 mg tablet, Take 1 tablet (500 mg total) by mouth daily with breakfast, Disp: 100 tablet, Rfl: 1    pantoprazole (PROTONIX) 40 mg tablet, Take 1 tablet (40 mg total) by mouth daily before breakfast, Disp: 90 tablet, Rfl: 1    Tirzepatide 12.5 MG/0.5ML SOAJ, Inject 12.5 mg under the skin once a week, Disp: 2 mL, Rfl: 5    valACYclovir (VALTREX) 500 mg tablet, Take 500 mg by mouth daily as needed, Disp: , Rfl:     amLODIPine-valsartan (EXFORGE) 5-160 MG per tablet, Take 1 tablet by mouth daily, Disp: 90 tablet, Rfl: 1    atorvastatin (LIPITOR) 10 mg tablet, Take 1 tablet (10 mg total) by mouth daily, Disp: 90 tablet, Rfl: 1  [2]   Past Medical History:  Diagnosis Date    Abscess of apex of dental root complicating chronic inflammation 8/22/2018    Acute bronchitis 2/23/2018    Anxiety     Depression 5/4/2016    Diabetes mellitus (HCC)     NIDDM    Flu-like symptoms 2/27/2018    Herpes simplex infection     Hypertension     Obesity 4/14/2020    Primary osteoarthritis  involving multiple joints 2022    Request for sterilization     Right maxillary sinusitis 2018    Scratch     left lower arm- healing- scabbed    Seasonal allergies     Strep throat 3/2/2018   [3]   Past Surgical History:  Procedure Laterality Date     SECTION      OVARIAN CYST SURGERY      [place] LAPAROSCOPY FULGURATION OVIDUCTS N/A 2017    Procedure: LIGATION/COAGULATION TUBAL LAPAROSCOPIC; LYSIS OF ADHESIONS;  Surgeon: Beverly Noble DO;  Location: [facility];  Service: Gynecology    TUBAL LIGATION  2018    WISDOM TOOTH EXTRACTION     [4]   Family History  Problem Relation Name Age of Onset    Diabetes Mother Mom     Hypertension Mother Mom     Cancer Mother Mom         Uterine    Uterine cancer Mother Mom 65    Diabetes type II Father Dad     Heart disease Father Dad         cardiac disorder     Hypertension Father Dad     Diabetes Father Dad     No Known Problems Maternal Grandmother      No Known Problems Maternal Grandfather      No Known Problems Paternal Grandmother      Colon cancer Paternal Grandfather      No Known Problems Son      No Known Problems Maternal Aunt      No Known Problems Maternal Aunt      No Known Problems Maternal Aunt      No Known Problems Maternal Aunt      No Known Problems Maternal Aunt      No Known Problems Paternal Aunt

## 2025-07-06 NOTE — LETTER
July 6, 2025     Patient: Yadi Gillette   YOB: 1980   Date of Visit: 7/6/2025       To Whom It May Concern:    It is my medical opinion that Yadi Gillette may return to work on 7/8/2025.    If you have any questions or concerns, please don't hesitate to call.         Sincerely,        Misael Chavez Jr, PAROLANDO    CC: No Recipients

## 2025-07-06 NOTE — PROGRESS NOTES
[facility] Now        NAME: Yadi Gillette is a 45 y.o. female  : 1980    MRN: [id number]  DATE: 2025  TIME: 3:35 PM    There were no vitals taken for this visit.    Assessment and Plan   No primary diagnosis found.  No diagnosis found.      Patient Instructions       Follow up with PCP in 3-5 days.  Proceed to  ER if symptoms worsen.    Chief Complaint   No chief complaint on file.        History of Present Illness       HPI    Review of Systems   Review of Systems      Current Medications     Current Medications[1]    Current Allergies     Allergies as of 2025 - Reviewed 2025   Allergen Reaction Noted    Lotrel [amlodipine besy-benazepril hcl] Headache 2021    Percocet [oxycodone-acetaminophen] Itching 2016    Hydrocodone Itching 2020    Other  2016    Oxycodone Itching 2020    Pollen extract  2016    Tramadol Itching 2018            The following portions of the patient's history were reviewed and updated as appropriate: allergies, current medications, past family history, past medical history, past social history, past surgical history and problem list.     Past Medical History[2]    Past Surgical History[3]    Family History[4]      Medications have been verified.        Objective   There were no vitals taken for this visit.       Physical Exam     Physical Exam                     [1]   Current Outpatient Medications:     amLODIPine-valsartan (EXFORGE) 5-160 MG per tablet, Take 1 tablet by mouth daily, Disp: 90 tablet, Rfl: 1    atorvastatin (LIPITOR) 10 mg tablet, Take 1 tablet (10 mg total) by mouth daily, Disp: 90 tablet, Rfl: 1    celecoxib (CeleBREX) 100 mg capsule, Take 1 capsule (100 mg total) by mouth 2 (two) times a day, Disp: 180 capsule, Rfl: 1    cetirizine (ZyrTEC) 10 mg tablet, Take 10 mg by mouth daily, Disp: , Rfl:     citalopram (CeleXA) 40 mg tablet, Take 1 tablet (40 mg total) by mouth every evening, Disp: 100  tablet, Rfl: 1    metFORMIN (GLUCOPHAGE) 500 mg tablet, Take 1 tablet (500 mg total) by mouth daily with breakfast, Disp: 100 tablet, Rfl: 1    pantoprazole (PROTONIX) 40 mg tablet, Take 1 tablet (40 mg total) by mouth daily before breakfast, Disp: 90 tablet, Rfl: 1    Tirzepatide 12.5 MG/0.5ML SOAJ, Inject 12.5 mg under the skin once a week, Disp: 2 mL, Rfl: 5    valACYclovir (VALTREX) 500 mg tablet, Take 500 mg by mouth daily as needed, Disp: , Rfl:   [2]   Past Medical History:  Diagnosis Date    Abscess of apex of dental root complicating chronic inflammation 2018    Acute bronchitis 2018    Anxiety     Depression 2016    Diabetes mellitus (HCC)     NIDDM    Flu-like symptoms 2018    Herpes simplex infection     Hypertension     Obesity 2020    Primary osteoarthritis involving multiple joints 2022    Request for sterilization     Right maxillary sinusitis 2018    Scratch     left lower arm- healing- scabbed    Seasonal allergies     Strep throat 3/2/2018   [3]   Past Surgical History:  Procedure Laterality Date     SECTION      OVARIAN CYST SURGERY      [place] LAPAROSCOPY FULGURATION OVIDUCTS N/A 2017    Procedure: LIGATION/COAGULATION TUBAL LAPAROSCOPIC; LYSIS OF ADHESIONS;  Surgeon: Beverly Noble DO;  Location: [facility];  Service: Gynecology    TUBAL LIGATION  2018    WISDOM TOOTH EXTRACTION     [4]   Family History  Problem Relation Name Age of Onset    Diabetes Mother Mom     Hypertension Mother Mom     Cancer Mother Mom         Uterine    Uterine cancer Mother Mom 65    Diabetes type II Father Dad     Heart disease Father Dad         cardiac disorder     Hypertension Father Dad     Diabetes Father Dad     No Known Problems Maternal Grandmother      No Known Problems Maternal Grandfather      No Known Problems Paternal Grandmother      Colon cancer Paternal Grandfather      No Known Problems Son      No Known Problems Maternal Aunt      No Known Problems  Maternal Aunt      No Known Problems Maternal Aunt      No Known Problems Maternal Aunt      No Known Problems Maternal Aunt      No Known Problems Paternal Aunt

## 2025-07-11 ENCOUNTER — HOSPITAL ENCOUNTER (OUTPATIENT)
Dept: MAMMOGRAPHY | Facility: MEDICAL CENTER | Age: 45
Discharge: HOME | End: 2025-07-11
Payer: COMMERCIAL

## 2025-07-11 VITALS — BODY MASS INDEX: 35.63 KG/M2 | HEIGHT: 67 IN | WEIGHT: 227 LBS

## 2025-07-11 DIAGNOSIS — Z12.31 ENCOUNTER FOR SCREENING MAMMOGRAM FOR BREAST CANCER: ICD-10-CM

## 2025-07-11 PROCEDURE — 77067 SCR MAMMO BI INCL CAD: CPT

## 2025-07-11 PROCEDURE — 77063 BREAST TOMOSYNTHESIS BI: CPT

## 2025-07-23 DIAGNOSIS — E66.09 CLASS 1 OBESITY DUE TO EXCESS CALORIES WITH SERIOUS COMORBIDITY AND BODY MASS INDEX (BMI) OF 34.0 TO 34.9 IN ADULT: ICD-10-CM

## 2025-07-23 DIAGNOSIS — E66.811 CLASS 1 OBESITY DUE TO EXCESS CALORIES WITH SERIOUS COMORBIDITY AND BODY MASS INDEX (BMI) OF 34.0 TO 34.9 IN ADULT: ICD-10-CM

## 2025-07-23 DIAGNOSIS — E11.9 CONTROLLED TYPE 2 DIABETES MELLITUS WITHOUT COMPLICATION, WITHOUT LONG-TERM CURRENT USE OF INSULIN (HCC): ICD-10-CM

## 2025-07-25 DIAGNOSIS — M15.0 PRIMARY OSTEOARTHRITIS INVOLVING MULTIPLE JOINTS: ICD-10-CM

## 2025-07-25 RX ORDER — TIRZEPATIDE 12.5 MG/.5ML
INJECTION, SOLUTION SUBCUTANEOUS
Qty: 2 ML | Refills: 0 | Status: SHIPPED | OUTPATIENT
Start: 2025-07-25

## 2025-07-28 RX ORDER — CELECOXIB 100 MG/1
100 CAPSULE ORAL 2 TIMES DAILY
Qty: 60 CAPSULE | Refills: 0 | Status: SHIPPED | OUTPATIENT
Start: 2025-07-28 | End: 2025-08-08 | Stop reason: SDUPTHER

## 2025-07-29 DIAGNOSIS — K30 DELAYED GASTRIC EMPTYING: ICD-10-CM

## 2025-07-30 RX ORDER — PANTOPRAZOLE SODIUM 40 MG/1
40 TABLET, DELAYED RELEASE ORAL
Qty: 90 TABLET | Refills: 0 | Status: SHIPPED | OUTPATIENT
Start: 2025-07-30 | End: 2025-08-08 | Stop reason: SDUPTHER

## 2025-08-05 LAB
ALBUMIN SERPL-MCNC: 4 G/DL (ref 3.5–5.7)
ALP SERPL-CCNC: 85 U/L (ref 35–120)
ALT SERPL-CCNC: 13 U/L
ANION GAP SERPL CALCULATED.3IONS-SCNC: 10 MMOL/L (ref 3–11)
AST SERPL-CCNC: 12 U/L
BASOPHILS # BLD AUTO: 0.1 THOU/CMM (ref 0–0.1)
BASOPHILS NFR BLD AUTO: 1 %
BILIRUB SERPL-MCNC: 0.5 MG/DL (ref 0.2–1)
BUN SERPL-MCNC: 12 MG/DL (ref 7–25)
CALCIUM SERPL-MCNC: 9.3 MG/DL (ref 8.5–10.5)
CHLORIDE SERPL-SCNC: 102 MMOL/L (ref 100–109)
CO2 SERPL-SCNC: 27 MMOL/L (ref 21–31)
CREAT SERPL-MCNC: 0.7 MG/DL (ref 0.4–1.1)
CYTOLOGY CMNT CVX/VAG CYTO-IMP: ABNORMAL
DIFFERENTIAL METHOD BLD: NORMAL
EOSINOPHIL # BLD AUTO: 0.1 THOU/CMM (ref 0–0.5)
EOSINOPHIL NFR BLD AUTO: 1 %
ERYTHROCYTE [DISTWIDTH] IN BLOOD BY AUTOMATED COUNT: 14.5 % (ref 12–16)
EST. AVERAGE GLUCOSE BLD GHB EST-MCNC: 157 MG/DL
GFR/BSA.PRED SERPLBLD CYS-BASED-ARV: 107 ML/MIN/{1.73_M2}
GLUCOSE SERPL-MCNC: 140 MG/DL (ref 65–99)
HBA1C MFR BLD: 7.1 %
HCT VFR BLD AUTO: 37.4 % (ref 35–43)
HGB BLD-MCNC: 12.1 G/DL (ref 11.5–14.5)
LYMPHOCYTES # BLD AUTO: 3 THOU/CMM (ref 1–3)
LYMPHOCYTES NFR BLD AUTO: 30 %
MCH RBC QN AUTO: 27.3 PG (ref 26–34)
MCHC RBC AUTO-ENTMCNC: 32.3 G/DL (ref 32–37)
MCV RBC AUTO: 85 FL (ref 80–100)
MONOCYTES # BLD AUTO: 0.8 THOU/CMM (ref 0.3–1)
MONOCYTES NFR BLD AUTO: 8 %
NEUTROPHILS # BLD AUTO: 6 THOU/CMM (ref 1.8–7.8)
NEUTROPHILS NFR BLD AUTO: 60 %
PLATELET # BLD AUTO: 215 THOU/CMM (ref 140–350)
PMV BLD REES-ECKER: 10.6 FL (ref 7.5–11.3)
POTASSIUM SERPL-SCNC: 4.5 MMOL/L (ref 3.5–5.2)
PROT SERPL-MCNC: 6.8 G/DL (ref 6.3–8.3)
RBC # BLD AUTO: 4.41 MILL/CMM (ref 3.7–4.7)
SODIUM SERPL-SCNC: 139 MMOL/L (ref 135–145)
WBC # BLD AUTO: 9.9 THOU/CMM (ref 4–10)

## 2025-08-08 ENCOUNTER — TELEPHONE (OUTPATIENT)
Age: 45
End: 2025-08-08

## 2025-08-08 ENCOUNTER — OFFICE VISIT (OUTPATIENT)
Age: 45
End: 2025-08-08
Payer: COMMERCIAL

## 2025-08-08 VITALS
HEIGHT: 67 IN | OXYGEN SATURATION: 99 % | DIASTOLIC BLOOD PRESSURE: 70 MMHG | BODY MASS INDEX: 34.65 KG/M2 | SYSTOLIC BLOOD PRESSURE: 124 MMHG | TEMPERATURE: 99.1 F | HEART RATE: 92 BPM | WEIGHT: 220.8 LBS

## 2025-08-08 DIAGNOSIS — M15.0 PRIMARY OSTEOARTHRITIS INVOLVING MULTIPLE JOINTS: ICD-10-CM

## 2025-08-08 DIAGNOSIS — E11.9 CONTROLLED TYPE 2 DIABETES MELLITUS WITHOUT COMPLICATION, WITHOUT LONG-TERM CURRENT USE OF INSULIN (HCC): ICD-10-CM

## 2025-08-08 DIAGNOSIS — E66.09 CLASS 1 OBESITY DUE TO EXCESS CALORIES WITH SERIOUS COMORBIDITY AND BODY MASS INDEX (BMI) OF 34.0 TO 34.9 IN ADULT: ICD-10-CM

## 2025-08-08 DIAGNOSIS — E66.811 CLASS 1 OBESITY DUE TO EXCESS CALORIES WITH SERIOUS COMORBIDITY AND BODY MASS INDEX (BMI) OF 34.0 TO 34.9 IN ADULT: ICD-10-CM

## 2025-08-08 DIAGNOSIS — I10 BENIGN ESSENTIAL HTN: ICD-10-CM

## 2025-08-08 DIAGNOSIS — K30 DELAYED GASTRIC EMPTYING: ICD-10-CM

## 2025-08-08 DIAGNOSIS — F32.2 SEVERE MAJOR DEPRESSIVE DISORDER (HCC): Primary | ICD-10-CM

## 2025-08-08 PROCEDURE — 99214 OFFICE O/P EST MOD 30 MIN: CPT | Performed by: INTERNAL MEDICINE

## 2025-08-08 RX ORDER — PANTOPRAZOLE SODIUM 40 MG/1
40 TABLET, DELAYED RELEASE ORAL
Qty: 100 TABLET | Refills: 1 | Status: SHIPPED | OUTPATIENT
Start: 2025-08-08

## 2025-08-08 RX ORDER — CITALOPRAM HYDROBROMIDE 40 MG/1
40 TABLET ORAL EVERY EVENING
Qty: 100 TABLET | Refills: 1 | Status: SHIPPED | OUTPATIENT
Start: 2025-08-08

## 2025-08-08 RX ORDER — CELECOXIB 100 MG/1
100 CAPSULE ORAL 2 TIMES DAILY
Qty: 200 CAPSULE | Refills: 1 | Status: SHIPPED | OUTPATIENT
Start: 2025-08-08

## 2025-08-08 RX ORDER — ATORVASTATIN CALCIUM 10 MG/1
10 TABLET, FILM COATED ORAL DAILY
Qty: 100 TABLET | Refills: 1 | Status: SHIPPED | OUTPATIENT
Start: 2025-08-08

## 2025-08-08 RX ORDER — AMLODIPINE AND VALSARTAN 5; 160 MG/1; MG/1
1 TABLET ORAL DAILY
Qty: 100 TABLET | Refills: 1 | Status: SHIPPED | OUTPATIENT
Start: 2025-08-08

## 2025-08-08 RX ORDER — TIRZEPATIDE 12.5 MG/.5ML
12.5 INJECTION, SOLUTION SUBCUTANEOUS WEEKLY
Qty: 2 ML | Refills: 1 | Status: SHIPPED | OUTPATIENT
Start: 2025-08-08 | End: 2025-10-03

## 2025-08-08 RX ORDER — LEVONORGESTREL AND ETHINYL ESTRADIOL 0.15-0.03
1 KIT ORAL DAILY
COMMUNITY
Start: 2025-07-31

## (undated) DEVICE — [HIGH FLOW INSUFFLATOR,  DO NOT USE IF PACKAGE IS DAMAGED,  KEEP DRY,  KEEP AWAY FROM SUNLIGHT,  PROTECT FROM HEAT AND RADIOACTIVE SOURCES.]: Brand: PNEUMOSURE

## (undated) DEVICE — GLOVE SRG BIOGEL 7

## (undated) DEVICE — ENDOPATH XCEL BLADELESS TROCARS WITH STABILITY SLEEVES: Brand: ENDOPATH XCEL

## (undated) DEVICE — AEM CORD

## (undated) DEVICE — ADHESIVE SKN CLSR HISTOACRYL FLEX 0.5ML LF

## (undated) DEVICE — GLOVE INDICATOR PI UNDERGLOVE SZ 7 BLUE

## (undated) DEVICE — PREMIUM DRY TRAY LF: Brand: MEDLINE INDUSTRIES, INC.

## (undated) DEVICE — GLOVE INDICATOR PI UNDERGLOVE SZ 6.5 BLUE

## (undated) DEVICE — SCD SEQUENTIAL COMPRESSION COMFORT SLEEVE MEDIUM KNEE LENGTH: Brand: KENDALL SCD

## (undated) DEVICE — LAP AEM SCISSOR TIP .75 IN

## (undated) DEVICE — CHLORAPREP HI-LITE 26ML ORANGE

## (undated) DEVICE — UNIVERSAL GYN LAPAROSCOPY,KIT: Brand: CARDINAL HEALTH

## (undated) DEVICE — INTENDED FOR TISSUE SEPARATION, AND OTHER PROCEDURES THAT REQUIRE A SHARP SURGICAL BLADE TO PUNCTURE OR CUT.: Brand: BARD-PARKER SAFETY BLADES SIZE 11, STERILE

## (undated) DEVICE — BLUE HEAT SCOPE WARMER

## (undated) DEVICE — ENDOPATH XCEL UNIVERSAL TROCAR STABLILITY SLEEVES: Brand: ENDOPATH XCEL

## (undated) DEVICE — GLOVE SRG BIOGEL 6.5

## (undated) DEVICE — SUT MONOCRYL 4-0 PS-2 27 IN Y426H